# Patient Record
Sex: FEMALE | Race: BLACK OR AFRICAN AMERICAN | NOT HISPANIC OR LATINO | Employment: OTHER | URBAN - METROPOLITAN AREA
[De-identification: names, ages, dates, MRNs, and addresses within clinical notes are randomized per-mention and may not be internally consistent; named-entity substitution may affect disease eponyms.]

---

## 2023-04-14 ENCOUNTER — OFFICE VISIT (OUTPATIENT)
Dept: PRIMARY CARE | Facility: CLINIC | Age: 73
End: 2023-04-14
Payer: MEDICARE

## 2023-04-14 ENCOUNTER — LAB (OUTPATIENT)
Dept: LAB | Facility: LAB | Age: 73
End: 2023-04-14
Payer: MEDICARE

## 2023-04-14 VITALS
DIASTOLIC BLOOD PRESSURE: 80 MMHG | TEMPERATURE: 95.5 F | SYSTOLIC BLOOD PRESSURE: 170 MMHG | HEART RATE: 98 BPM | OXYGEN SATURATION: 97 % | WEIGHT: 287 LBS | RESPIRATION RATE: 16 BRPM

## 2023-04-14 DIAGNOSIS — E11.69 DIABETES MELLITUS TYPE 2 IN OBESE: ICD-10-CM

## 2023-04-14 DIAGNOSIS — Z12.31 ENCOUNTER FOR SCREENING MAMMOGRAM FOR MALIGNANT NEOPLASM OF BREAST: ICD-10-CM

## 2023-04-14 DIAGNOSIS — I82.469 DEEP VEIN THROMBOSIS (DVT) OF CALF MUSCLE VEIN, UNSPECIFIED CHRONICITY, UNSPECIFIED LATERALITY (MULTI): ICD-10-CM

## 2023-04-14 DIAGNOSIS — E78.00 HYPERCHOLESTEROLEMIA: ICD-10-CM

## 2023-04-14 DIAGNOSIS — N18.31 TYPE 2 DIABETES MELLITUS WITH STAGE 3A CHRONIC KIDNEY DISEASE, WITHOUT LONG-TERM CURRENT USE OF INSULIN (MULTI): ICD-10-CM

## 2023-04-14 DIAGNOSIS — E11.22 TYPE 2 DIABETES MELLITUS WITH STAGE 3A CHRONIC KIDNEY DISEASE, WITHOUT LONG-TERM CURRENT USE OF INSULIN (MULTI): ICD-10-CM

## 2023-04-14 DIAGNOSIS — E66.9 DIABETES MELLITUS TYPE 2 IN OBESE: ICD-10-CM

## 2023-04-14 DIAGNOSIS — N18.31 STAGE 3A CHRONIC KIDNEY DISEASE (MULTI): ICD-10-CM

## 2023-04-14 DIAGNOSIS — I10 ESSENTIAL HYPERTENSION: Primary | ICD-10-CM

## 2023-04-14 DIAGNOSIS — K21.9 GASTROESOPHAGEAL REFLUX DISEASE WITHOUT ESOPHAGITIS: ICD-10-CM

## 2023-04-14 DIAGNOSIS — E66.01 MORBID (SEVERE) OBESITY DUE TO EXCESS CALORIES (MULTI): ICD-10-CM

## 2023-04-14 DIAGNOSIS — J45.40 MODERATE PERSISTENT ASTHMA WITHOUT COMPLICATION (HHS-HCC): ICD-10-CM

## 2023-04-14 PROBLEM — R09.02 HYPOXIA: Status: RESOLVED | Noted: 2021-02-02 | Resolved: 2023-04-14

## 2023-04-14 PROBLEM — E44.1 MALNUTRITION OF MILD DEGREE (MULTI): Status: RESOLVED | Noted: 2021-01-23 | Resolved: 2023-04-14

## 2023-04-14 PROBLEM — I82.409 DVT (DEEP VENOUS THROMBOSIS) (MULTI): Status: ACTIVE | Noted: 2021-03-04

## 2023-04-14 PROBLEM — R10.33 PERIUMBILICAL ABDOMINAL PAIN: Status: RESOLVED | Noted: 2023-04-14 | Resolved: 2023-04-14

## 2023-04-14 PROBLEM — R10.33 PERIUMBILICAL ABDOMINAL PAIN: Status: ACTIVE | Noted: 2023-04-14

## 2023-04-14 PROBLEM — N18.30 STAGE 3 CHRONIC KIDNEY DISEASE (MULTI): Status: ACTIVE | Noted: 2022-01-31

## 2023-04-14 PROBLEM — R09.02 HYPOXIA: Status: ACTIVE | Noted: 2021-02-02

## 2023-04-14 PROBLEM — E44.1 MALNUTRITION OF MILD DEGREE (MULTI): Status: ACTIVE | Noted: 2021-01-23

## 2023-04-14 PROCEDURE — 3077F SYST BP >= 140 MM HG: CPT

## 2023-04-14 PROCEDURE — 3079F DIAST BP 80-89 MM HG: CPT

## 2023-04-14 PROCEDURE — 80053 COMPREHEN METABOLIC PANEL: CPT

## 2023-04-14 PROCEDURE — 1160F RVW MEDS BY RX/DR IN RCRD: CPT

## 2023-04-14 PROCEDURE — 83036 HEMOGLOBIN GLYCOSYLATED A1C: CPT

## 2023-04-14 PROCEDURE — 84443 ASSAY THYROID STIM HORMONE: CPT

## 2023-04-14 PROCEDURE — 99204 OFFICE O/P NEW MOD 45 MIN: CPT

## 2023-04-14 PROCEDURE — 1036F TOBACCO NON-USER: CPT

## 2023-04-14 PROCEDURE — 36415 COLL VENOUS BLD VENIPUNCTURE: CPT

## 2023-04-14 PROCEDURE — 4010F ACE/ARB THERAPY RXD/TAKEN: CPT

## 2023-04-14 PROCEDURE — 80061 LIPID PANEL: CPT

## 2023-04-14 PROCEDURE — 1159F MED LIST DOCD IN RCRD: CPT

## 2023-04-14 PROCEDURE — 85027 COMPLETE CBC AUTOMATED: CPT

## 2023-04-14 RX ORDER — METOPROLOL SUCCINATE 50 MG/1
50 TABLET, EXTENDED RELEASE ORAL DAILY
COMMUNITY
Start: 2021-02-01 | End: 2023-04-14 | Stop reason: SDUPTHER

## 2023-04-14 RX ORDER — ATORVASTATIN CALCIUM 40 MG/1
40 TABLET, FILM COATED ORAL DAILY
Qty: 90 TABLET | Refills: 0 | Status: SHIPPED | OUTPATIENT
Start: 2023-04-14 | End: 2023-07-31 | Stop reason: SDUPTHER

## 2023-04-14 RX ORDER — FLUTICASONE PROPIONATE AND SALMETEROL 250; 50 UG/1; UG/1
1 POWDER RESPIRATORY (INHALATION)
Qty: 180 EACH | Refills: 0 | Status: SHIPPED | OUTPATIENT
Start: 2023-04-14 | End: 2023-04-25 | Stop reason: SDUPTHER

## 2023-04-14 RX ORDER — ACETAMINOPHEN 500 MG
1 TABLET ORAL ONCE
Qty: 1 EACH | Refills: 0 | Status: SHIPPED | OUTPATIENT
Start: 2023-04-14 | End: 2023-04-14

## 2023-04-14 RX ORDER — LOSARTAN POTASSIUM 50 MG/1
50 TABLET ORAL DAILY
COMMUNITY
End: 2023-04-14 | Stop reason: SDUPTHER

## 2023-04-14 RX ORDER — METFORMIN HYDROCHLORIDE 1000 MG/1
1000 TABLET ORAL
Qty: 180 TABLET | Refills: 0 | Status: SHIPPED | OUTPATIENT
Start: 2023-04-14 | End: 2023-04-27

## 2023-04-14 RX ORDER — FLASH GLUCOSE SENSOR
KIT MISCELLANEOUS
COMMUNITY
Start: 2022-12-27 | End: 2023-04-14 | Stop reason: SDUPTHER

## 2023-04-14 RX ORDER — GLIPIZIDE 10 MG/1
10 TABLET, FILM COATED, EXTENDED RELEASE ORAL
Qty: 90 TABLET | Refills: 0 | Status: SHIPPED | OUTPATIENT
Start: 2023-04-14 | End: 2023-07-31 | Stop reason: SDUPTHER

## 2023-04-14 RX ORDER — LOSARTAN POTASSIUM 50 MG/1
50 TABLET ORAL DAILY
Qty: 90 TABLET | Refills: 0 | Status: SHIPPED | OUTPATIENT
Start: 2023-04-14 | End: 2023-07-10 | Stop reason: SDUPTHER

## 2023-04-14 RX ORDER — METOPROLOL SUCCINATE 50 MG/1
50 TABLET, EXTENDED RELEASE ORAL DAILY
Qty: 90 TABLET | Refills: 0 | Status: SHIPPED | OUTPATIENT
Start: 2023-04-14 | End: 2023-07-31 | Stop reason: SDUPTHER

## 2023-04-14 RX ORDER — FLUTICASONE PROPIONATE AND SALMETEROL 250; 50 UG/1; UG/1
1 POWDER RESPIRATORY (INHALATION) 2 TIMES DAILY
COMMUNITY
Start: 2021-12-16 | End: 2023-04-14 | Stop reason: SDUPTHER

## 2023-04-14 RX ORDER — FLASH GLUCOSE SENSOR
KIT MISCELLANEOUS
Qty: 6 EACH | Refills: 0 | Status: SHIPPED | OUTPATIENT
Start: 2023-04-14 | End: 2023-06-08 | Stop reason: RX

## 2023-04-14 RX ORDER — ATORVASTATIN CALCIUM 40 MG/1
40 TABLET, FILM COATED ORAL DAILY
COMMUNITY
Start: 2016-01-08 | End: 2023-04-14 | Stop reason: SDUPTHER

## 2023-04-14 RX ORDER — ASPIRIN 81 MG/1
81 TABLET ORAL DAILY
COMMUNITY
End: 2023-11-29 | Stop reason: SDUPTHER

## 2023-04-14 RX ORDER — METFORMIN HYDROCHLORIDE 1000 MG/1
1000 TABLET ORAL
COMMUNITY
Start: 2016-03-16 | End: 2023-04-14 | Stop reason: SDUPTHER

## 2023-04-14 RX ORDER — GLIPIZIDE 10 MG/1
10 TABLET, FILM COATED, EXTENDED RELEASE ORAL
COMMUNITY
End: 2023-04-14 | Stop reason: SDUPTHER

## 2023-04-14 ASSESSMENT — ENCOUNTER SYMPTOMS
OCCASIONAL FEELINGS OF UNSTEADINESS: 0
LOSS OF SENSATION IN FEET: 0
DEPRESSION: 0

## 2023-04-14 ASSESSMENT — PATIENT HEALTH QUESTIONNAIRE - PHQ9
2. FEELING DOWN, DEPRESSED OR HOPELESS: NOT AT ALL
SUM OF ALL RESPONSES TO PHQ9 QUESTIONS 1 AND 2: 0
1. LITTLE INTEREST OR PLEASURE IN DOING THINGS: NOT AT ALL

## 2023-04-14 NOTE — PROGRESS NOTES
Subjective   Patient ID: Renee Candelario is a 73 y.o. female who presents for Establish Care.  HPI  73 year old female with PMH of T2DM, HTN, HLD.   DM2: glipizide XL 10mg and metformin   HTN: currently takes losartan 50mg, metoprolol 50mg, though has been out of medication x4 days.   HLD: On atorvastatin 40mg daily    Did Cologuard last year, will try to get records     Diet: Watches sugar and carbohydrate intake  Exercise: planning to restart exercising now that weather is warmer  Nicotine: none  ETOH: none  Drug use: none  Dental care: Need to establish care  Vision concerns: UTD  Hearing concerns: none    All systems have been reviewed and are negative for complaint other than those mentioned in the HPI.     /80   Pulse 98   Temp 35.3 °C (95.5 °F)   Resp 16   Wt 130 kg (287 lb)   SpO2 97%    Objective   Physical Exam  Constitutional:       General: She is awake.      Appearance: Normal appearance.   HENT:      Head: Normocephalic and atraumatic.   Eyes:      Extraocular Movements: Extraocular movements intact.      Pupils: Pupils are equal, round, and reactive to light.   Cardiovascular:      Rate and Rhythm: Normal rate and regular rhythm.      Heart sounds: S1 normal and S2 normal. No murmur heard.  Pulmonary:      Effort: Pulmonary effort is normal.      Breath sounds: Normal breath sounds.   Musculoskeletal:      Cervical back: Normal range of motion and neck supple.      Right lower leg: No edema.      Left lower leg: No edema.   Skin:     General: Skin is warm and dry.   Neurological:      General: No focal deficit present.      Mental Status: She is alert and oriented to person, place, and time.   Psychiatric:         Mood and Affect: Mood and affect normal.         Behavior: Behavior normal. Behavior is cooperative.         Thought Content: Thought content normal.         Judgment: Judgment normal.     Renee was seen today for establish care.  Diagnoses and all orders for this  visit:  Essential hypertension (Primary)  -     NOT AT GOAL, has been out of HTN medication x4 days. Recommend picking up new Rx, taking medication x2 weeks. Follow up in office for BP recheck  - Recommend checking once daily at home and keeping a log. Bring to next appt  - DASH diet reviewed  - losartan (Cozaar) 50 mg tablet; Take 1 tablet (50 mg) by mouth once daily.  -     metoprolol succinate XL (Toprol-XL) 50 mg 24 hr tablet; Take 1 tablet (50 mg) by mouth once daily.  -     blood pressure test kit-large kit; 1 kit 1 time for 1 dose.  -     Referral to Ophthalmology; Future  -     Disability Placard  Type 2 diabetes mellitus with stage 3a chronic kidney disease, without long-term current use of insulin (CMS/HCC)  -     Currently taking metformin, glipizide, and using scarlet CGM. Patient would likely benefit from GLP-1. Will check A1C today and address at next visit.   - FreeStyle Scarlet sensor system (FreeStyle Scarlet 14 Day Sensor) kit; use as directed  -     glipiZIDE XL (Glucotrol XL) 10 mg 24 hr tablet; Take 1 tablet (10 mg) by mouth once daily with a meal.  -     metFORMIN (Glucophage) 1,000 mg tablet; Take 1 tablet (1,000 mg) by mouth in the morning and 1 tablet (1,000 mg) in the evening. Take with meals.  -     CBC; Future  -     Comprehensive Metabolic Panel; Future  -     Hemoglobin A1C; Future  -     TSH with reflex to Free T4 if abnormal; Future  -     Albumin, urine, random; Future  Deep vein thrombosis (DVT) of calf muscle vein, unspecified chronicity, unspecified laterality (CMS/HCC)  -     DVT years ago when she had COVID, resolved. Was provoked, no longer on anticoagulant. No swelling in legs, no current symptoms  - Disability Placard  Morbid (severe) obesity due to excess calories (CMS/HCC)  -     Disability Placard  - Will discuss GLP-1 at next appointment. Discussed healthy diet, role of exercise. Patient will work on lifestyle modifications.   Stage 3a chronic kidney disease  -     CMP  today  - Disability Placard  Gastroesophageal reflux disease without esophagitis  -     Stable, uses prilosec PRN   Diabetes mellitus type 2 in obese (CMS/HCC)  -    See above  -  Referral to Ophthalmology; Future  -     Disability Placard  Hypercholesterolemia  -    Stable, on atorvastatin. Continue current meds.   -  atorvastatin (Lipitor) 40 mg tablet; Take 1 tablet (40 mg) by mouth once daily.  -     Lipid Panel; Future  Moderate persistent asthma without complication  -     Stable, continue current meds. Not needing rescue inhaler.   - fluticasone propion-salmeteroL (Advair Diskus) 250-50 mcg/dose diskus inhaler; Inhale 1 puff  in the morning and 1 puff in the evening.  -     Disability Placard  Encounter for screening mammogram for malignant neoplasm of breast  -     BI mammo bilateral screening tomosynthesis; Future  Other orders  -     Follow Up In Primary Care; Future    Follow up in 2 weeks for BP recheck.       Follow up up in 2 weeks.

## 2023-04-15 LAB
ALANINE AMINOTRANSFERASE (SGPT) (U/L) IN SER/PLAS: 12 U/L (ref 7–45)
ALBUMIN (G/DL) IN SER/PLAS: 4.1 G/DL (ref 3.4–5)
ALKALINE PHOSPHATASE (U/L) IN SER/PLAS: 118 U/L (ref 33–136)
ANION GAP IN SER/PLAS: 20 MMOL/L (ref 10–20)
ASPARTATE AMINOTRANSFERASE (SGOT) (U/L) IN SER/PLAS: 12 U/L (ref 9–39)
BILIRUBIN TOTAL (MG/DL) IN SER/PLAS: 0.3 MG/DL (ref 0–1.2)
CALCIUM (MG/DL) IN SER/PLAS: 9.8 MG/DL (ref 8.6–10.6)
CARBON DIOXIDE, TOTAL (MMOL/L) IN SER/PLAS: 21 MMOL/L (ref 21–32)
CHLORIDE (MMOL/L) IN SER/PLAS: 101 MMOL/L (ref 98–107)
CHOLESTEROL (MG/DL) IN SER/PLAS: 188 MG/DL (ref 0–199)
CHOLESTEROL IN HDL (MG/DL) IN SER/PLAS: 49.2 MG/DL
CHOLESTEROL/HDL RATIO: 3.8
CREATININE (MG/DL) IN SER/PLAS: 1.95 MG/DL (ref 0.5–1.05)
ERYTHROCYTE DISTRIBUTION WIDTH (RATIO) BY AUTOMATED COUNT: 14 % (ref 11.5–14.5)
ERYTHROCYTE MEAN CORPUSCULAR HEMOGLOBIN CONCENTRATION (G/DL) BY AUTOMATED: 29.6 G/DL (ref 32–36)
ERYTHROCYTE MEAN CORPUSCULAR VOLUME (FL) BY AUTOMATED COUNT: 101 FL (ref 80–100)
ERYTHROCYTES (10*6/UL) IN BLOOD BY AUTOMATED COUNT: 4.14 X10E12/L (ref 4–5.2)
ESTIMATED AVERAGE GLUCOSE FOR HBA1C: 260 MG/DL
GFR FEMALE: 27 ML/MIN/1.73M2
GLUCOSE (MG/DL) IN SER/PLAS: 355 MG/DL (ref 74–99)
HEMATOCRIT (%) IN BLOOD BY AUTOMATED COUNT: 41.9 % (ref 36–46)
HEMOGLOBIN (G/DL) IN BLOOD: 12.4 G/DL (ref 12–16)
HEMOGLOBIN A1C/HEMOGLOBIN TOTAL IN BLOOD: 10.7 %
LDL: 83 MG/DL (ref 0–99)
LEUKOCYTES (10*3/UL) IN BLOOD BY AUTOMATED COUNT: 8.5 X10E9/L (ref 4.4–11.3)
NON HDL CHOLESTEROL: 139 MG/DL
NRBC (PER 100 WBCS) BY AUTOMATED COUNT: 0 /100 WBC (ref 0–0)
PLATELETS (10*3/UL) IN BLOOD AUTOMATED COUNT: 292 X10E9/L (ref 150–450)
POTASSIUM (MMOL/L) IN SER/PLAS: 4.6 MMOL/L (ref 3.5–5.3)
PROTEIN TOTAL: 7.1 G/DL (ref 6.4–8.2)
SODIUM (MMOL/L) IN SER/PLAS: 137 MMOL/L (ref 136–145)
THYROTROPIN (MIU/L) IN SER/PLAS BY DETECTION LIMIT <= 0.05 MIU/L: 1.24 MIU/L (ref 0.44–3.98)
TRIGLYCERIDE (MG/DL) IN SER/PLAS: 279 MG/DL (ref 0–149)
UREA NITROGEN (MG/DL) IN SER/PLAS: 25 MG/DL (ref 6–23)
VLDL: 56 MG/DL (ref 0–40)

## 2023-04-18 ENCOUNTER — LAB (OUTPATIENT)
Dept: LAB | Facility: LAB | Age: 73
End: 2023-04-18
Payer: MEDICARE

## 2023-04-18 DIAGNOSIS — N18.31 TYPE 2 DIABETES MELLITUS WITH STAGE 3A CHRONIC KIDNEY DISEASE, WITHOUT LONG-TERM CURRENT USE OF INSULIN (MULTI): ICD-10-CM

## 2023-04-18 DIAGNOSIS — E11.22 TYPE 2 DIABETES MELLITUS WITH STAGE 3A CHRONIC KIDNEY DISEASE, WITHOUT LONG-TERM CURRENT USE OF INSULIN (MULTI): ICD-10-CM

## 2023-04-18 PROCEDURE — 82043 UR ALBUMIN QUANTITATIVE: CPT

## 2023-04-18 PROCEDURE — 82570 ASSAY OF URINE CREATININE: CPT

## 2023-04-19 ENCOUNTER — CLINICAL SUPPORT (OUTPATIENT)
Dept: PRIMARY CARE | Facility: CLINIC | Age: 73
End: 2023-04-19
Payer: MEDICARE

## 2023-04-19 DIAGNOSIS — E66.9 DIABETES MELLITUS TYPE 2 IN OBESE: ICD-10-CM

## 2023-04-19 DIAGNOSIS — E11.69 DIABETES MELLITUS TYPE 2 IN OBESE: ICD-10-CM

## 2023-04-19 LAB
ALBUMIN (MG/L) IN URINE: 30.3 MG/L
ALBUMIN/CREATININE (UG/MG) IN URINE: 41.4 UG/MG CRT (ref 0–30)
CREATININE (MG/DL) IN URINE: 73.2 MG/DL (ref 20–320)

## 2023-04-19 NOTE — PROGRESS NOTES
Patient here for diabetic insulin teaching. Patient brought own medication Ozempic 0.25 mg,0.5 mg. Patient taught proper technique with pen and injection site cleaning, dial- up correct insulin dose to use along with priming first time open pen, proper holding of pen to inject and count to 10, injection done. Patient confident with teaching understands process and proceeded to replicate teaching and gave self injection with out any issue. Patient understands to call if any questions or side-effects

## 2023-04-25 DIAGNOSIS — J45.40 MODERATE PERSISTENT ASTHMA WITHOUT COMPLICATION (HHS-HCC): ICD-10-CM

## 2023-04-25 RX ORDER — FLUTICASONE PROPIONATE AND SALMETEROL 250; 50 UG/1; UG/1
1 POWDER RESPIRATORY (INHALATION)
Qty: 180 EACH | Refills: 0 | Status: SHIPPED | OUTPATIENT
Start: 2023-04-25 | End: 2023-08-07 | Stop reason: SDUPTHER

## 2023-04-27 ENCOUNTER — OFFICE VISIT (OUTPATIENT)
Dept: PRIMARY CARE | Facility: CLINIC | Age: 73
End: 2023-04-27
Payer: MEDICARE

## 2023-04-27 VITALS
DIASTOLIC BLOOD PRESSURE: 70 MMHG | BODY MASS INDEX: 46.12 KG/M2 | SYSTOLIC BLOOD PRESSURE: 130 MMHG | TEMPERATURE: 96.4 F | WEIGHT: 287 LBS | HEIGHT: 66 IN

## 2023-04-27 DIAGNOSIS — I12.9 TYPE 2 DIABETES MELLITUS WITH STAGE 4 CHRONIC KIDNEY DISEASE AND HYPERTENSION (MULTI): ICD-10-CM

## 2023-04-27 DIAGNOSIS — N18.4 CKD (CHRONIC KIDNEY DISEASE) STAGE 4, GFR 15-29 ML/MIN (MULTI): ICD-10-CM

## 2023-04-27 DIAGNOSIS — I10 ESSENTIAL HYPERTENSION: ICD-10-CM

## 2023-04-27 DIAGNOSIS — N18.4 TYPE 2 DIABETES MELLITUS WITH STAGE 4 CHRONIC KIDNEY DISEASE AND HYPERTENSION (MULTI): ICD-10-CM

## 2023-04-27 DIAGNOSIS — Z12.11 SCREEN FOR COLON CANCER: ICD-10-CM

## 2023-04-27 DIAGNOSIS — N18.31 STAGE 3A CHRONIC KIDNEY DISEASE (MULTI): ICD-10-CM

## 2023-04-27 DIAGNOSIS — E78.00 HYPERCHOLESTEROLEMIA: ICD-10-CM

## 2023-04-27 DIAGNOSIS — Z00.00 MEDICARE ANNUAL WELLNESS VISIT, SUBSEQUENT: Primary | ICD-10-CM

## 2023-04-27 DIAGNOSIS — E11.22 TYPE 2 DIABETES MELLITUS WITH STAGE 4 CHRONIC KIDNEY DISEASE AND HYPERTENSION (MULTI): ICD-10-CM

## 2023-04-27 PROBLEM — N18.30 STAGE 3 CHRONIC KIDNEY DISEASE (MULTI): Status: RESOLVED | Noted: 2022-01-31 | Resolved: 2023-04-27

## 2023-04-27 PROBLEM — I82.409 DVT (DEEP VENOUS THROMBOSIS) (MULTI): Status: RESOLVED | Noted: 2021-03-04 | Resolved: 2023-04-27

## 2023-04-27 PROCEDURE — 3078F DIAST BP <80 MM HG: CPT

## 2023-04-27 PROCEDURE — 3075F SYST BP GE 130 - 139MM HG: CPT

## 2023-04-27 PROCEDURE — 3046F HEMOGLOBIN A1C LEVEL >9.0%: CPT

## 2023-04-27 PROCEDURE — 4010F ACE/ARB THERAPY RXD/TAKEN: CPT

## 2023-04-27 PROCEDURE — G0439 PPPS, SUBSEQ VISIT: HCPCS

## 2023-04-27 PROCEDURE — 1160F RVW MEDS BY RX/DR IN RCRD: CPT

## 2023-04-27 PROCEDURE — 99214 OFFICE O/P EST MOD 30 MIN: CPT

## 2023-04-27 PROCEDURE — 1159F MED LIST DOCD IN RCRD: CPT

## 2023-04-27 PROCEDURE — 1036F TOBACCO NON-USER: CPT

## 2023-04-27 ASSESSMENT — PATIENT HEALTH QUESTIONNAIRE - PHQ9
1. LITTLE INTEREST OR PLEASURE IN DOING THINGS: NOT AT ALL
SUM OF ALL RESPONSES TO PHQ9 QUESTIONS 1 AND 2: 0
2. FEELING DOWN, DEPRESSED OR HOPELESS: NOT AT ALL

## 2023-04-27 ASSESSMENT — LIFESTYLE VARIABLES: HOW OFTEN DO YOU HAVE A DRINK CONTAINING ALCOHOL: NEVER

## 2023-04-27 NOTE — PROGRESS NOTES
"Subjective   Patient ID: Renee Candelario is a 73 y.o. female who presents for Follow-up and MWV.  HPI  The patient is being seen for the subsequent annual wellness visit and follow up.  Past Medical, Surgical and Family History: reviewed and updated in chart.   Interval History: Patient has not been hospitalized previously.   Medications and Supplements: Review of all medications by a prescribing practitioner or clinical pharmacist (such as prescriptions, OTCs, herbal therapies and supplements) documented in the medical record.    No, the patient is not using opioids.   Health Risk Assessment:. Paper HRA completed by patient and scanned into chart.   Depression/Suicide Screening:    Done, negative.   No falls in the past year.  No recent hospitalizations.    73 year old female with PMH of T2DM, HTN, HLD.   DM2: , glipizide XL 10mg and ozempic .25mg (started 04/20/23) --> referral endocrine today  HTN: currently takes losartan 50mg, metoprolol 50mg  HLD: On atorvastatin 40mg daily  CKD: stage four. --> referral nephrology today    Checking blood sugar 2-3 times a day. Running 120s-320. Using Freestyle manfred. Need to discontinue metformin d/t low GFR.     All systems have been reviewed and are negative for complaint other than those mentioned in the HPI.     Objective   /70 (BP Location: Left arm, Patient Position: Sitting, BP Cuff Size: Large adult)   Temp 35.8 °C (96.4 °F) (Temporal)   Ht 1.676 m (5' 6\")   Wt 130 kg (287 lb)   BMI 46.32 kg/m²    Physical Exam  Constitutional:       General: She is awake.      Appearance: Normal appearance.   HENT:      Head: Normocephalic and atraumatic.   Eyes:      Extraocular Movements: Extraocular movements intact.      Pupils: Pupils are equal, round, and reactive to light.   Cardiovascular:      Rate and Rhythm: Normal rate and regular rhythm.      Heart sounds: S1 normal and S2 normal. No murmur heard.  Pulmonary:      Effort: Pulmonary effort is normal.      Breath " sounds: Normal breath sounds.   Musculoskeletal:      Cervical back: Normal range of motion and neck supple.      Right lower leg: No edema.      Left lower leg: No edema.   Skin:     General: Skin is warm and dry.   Neurological:      General: No focal deficit present.      Mental Status: She is alert and oriented to person, place, and time.   Psychiatric:         Mood and Affect: Mood and affect normal.         Behavior: Behavior normal. Behavior is cooperative.         Thought Content: Thought content normal.         Judgment: Judgment normal.     Renee was seen today for follow-up and mwv.  Diagnoses and all orders for this visit  Medicare annual wellness visit, subsequent (Primary)  - In usual state of health, no concerns today  - Due for colon cancer screening, recommend completing   -See scanned HRA for MWV documentation.   Essential hypertension  - Near goal today, continue current meds  Type 2 diabetes mellitus with stage 4 chronic kidney disease and hypertension (CMS/HCC)  -     A1C 10.4, not at goal. Started ozempic 0.25mg last week. Fasting -130 over past two weeks, BG up to 300s during day.   - Hesitant to start long acting insulin given low fasting blood glucoses. Needs to stop metformin given low GFR.   - Will reach out to endocrinology to help expedite establishing care.   - Referral to nutrition, large knowledge gap in diabetic diet.   - Referral to Endocrinology; Future  -     Referral to Nutrition Services; Future  Hypercholesterolemia   - Continue atorvastatin  CKD (chronic kidney disease) stage 4, GFR 15-29 ml/min (CMS/HCC)  -     GFR worsening in setting of uncontrolled diabetes. Asymptomatic. Stop metformin. Referrals to endo and nephro placed.   - Referral to Nephrology; Future  Screen for colon cancer  -     Cologuard® colon cancer screening; Future  Other orders  -     Follow Up In Primary Care    Follow up in 1 month if not yet seen by endocrinology to review blood glucoses.

## 2023-04-28 ENCOUNTER — APPOINTMENT (OUTPATIENT)
Dept: PRIMARY CARE | Facility: CLINIC | Age: 73
End: 2023-04-28
Payer: MEDICARE

## 2023-05-07 LAB — NONINV COLON CA DNA+OCC BLD SCRN STL QL: NORMAL

## 2023-05-18 LAB — NONINV COLON CA DNA+OCC BLD SCRN STL QL: NORMAL

## 2023-05-25 ENCOUNTER — APPOINTMENT (OUTPATIENT)
Dept: PRIMARY CARE | Facility: CLINIC | Age: 73
End: 2023-05-25
Payer: MEDICARE

## 2023-05-25 ENCOUNTER — OFFICE VISIT (OUTPATIENT)
Dept: PRIMARY CARE | Facility: CLINIC | Age: 73
End: 2023-05-25
Payer: MEDICARE

## 2023-05-25 VITALS
HEIGHT: 65 IN | DIASTOLIC BLOOD PRESSURE: 74 MMHG | WEIGHT: 283 LBS | SYSTOLIC BLOOD PRESSURE: 130 MMHG | BODY MASS INDEX: 47.15 KG/M2

## 2023-05-25 DIAGNOSIS — I10 ESSENTIAL HYPERTENSION: Primary | ICD-10-CM

## 2023-05-25 DIAGNOSIS — E78.00 HYPERCHOLESTEROLEMIA: ICD-10-CM

## 2023-05-25 DIAGNOSIS — N18.4 CKD (CHRONIC KIDNEY DISEASE) STAGE 4, GFR 15-29 ML/MIN (MULTI): ICD-10-CM

## 2023-05-25 DIAGNOSIS — E11.22 TYPE 2 DIABETES MELLITUS WITH STAGE 4 CHRONIC KIDNEY DISEASE AND HYPERTENSION (MULTI): ICD-10-CM

## 2023-05-25 DIAGNOSIS — N18.4 TYPE 2 DIABETES MELLITUS WITH STAGE 4 CHRONIC KIDNEY DISEASE AND HYPERTENSION (MULTI): ICD-10-CM

## 2023-05-25 DIAGNOSIS — I12.9 TYPE 2 DIABETES MELLITUS WITH STAGE 4 CHRONIC KIDNEY DISEASE AND HYPERTENSION (MULTI): ICD-10-CM

## 2023-05-25 DIAGNOSIS — E66.01 OBESITY, CLASS III, BMI 40-49.9 (MORBID OBESITY) (MULTI): ICD-10-CM

## 2023-05-25 PROCEDURE — 1159F MED LIST DOCD IN RCRD: CPT

## 2023-05-25 PROCEDURE — 1160F RVW MEDS BY RX/DR IN RCRD: CPT

## 2023-05-25 PROCEDURE — 1036F TOBACCO NON-USER: CPT

## 2023-05-25 PROCEDURE — 4010F ACE/ARB THERAPY RXD/TAKEN: CPT

## 2023-05-25 PROCEDURE — 3078F DIAST BP <80 MM HG: CPT

## 2023-05-25 PROCEDURE — 3046F HEMOGLOBIN A1C LEVEL >9.0%: CPT

## 2023-05-25 PROCEDURE — 3075F SYST BP GE 130 - 139MM HG: CPT

## 2023-05-25 PROCEDURE — 99214 OFFICE O/P EST MOD 30 MIN: CPT

## 2023-05-25 RX ORDER — DAPAGLIFLOZIN 10 MG/1
10 TABLET, FILM COATED ORAL DAILY
Qty: 90 TABLET | Refills: 0 | Status: SHIPPED | OUTPATIENT
Start: 2023-05-25 | End: 2023-07-31 | Stop reason: SDUPTHER

## 2023-05-25 RX ORDER — ALBUTEROL SULFATE 90 UG/1
2 AEROSOL, METERED RESPIRATORY (INHALATION) EVERY 6 HOURS PRN
COMMUNITY
Start: 2015-12-21 | End: 2024-03-13 | Stop reason: SDUPTHER

## 2023-05-25 NOTE — PROGRESS NOTES
"Subjective   Patient ID: Renee Candelario is a 73 y.o. female who presents for Follow-up.  HPI  73 year old female with PMH of T2DM, HTN, HLD.   DM2: , glipizide XL 10mg and ozempic .5mg weekly, freestyle Scarlet, --> referred to endocrinology at last appointment, has not yet scheduled. Not open to insulin.   HTN: currently takes losartan 50mg, metoprolol 50mg  HLD: On atorvastatin 40mg daily  CKD: stage four. --> referred to nephrology at last appointment, has not yet scheduled.      Using CGM, 55% in target, 45% above target.   Cologuard --> unable to process test as sample was not corrected correctly, will redo testing.     All systems have been reviewed and are negative for complaint other than those mentioned in the HPI.     Objective   /74 (BP Location: Left arm, Patient Position: Sitting, BP Cuff Size: Large adult)   Ht 1.651 m (5' 5\")   Wt 128 kg (283 lb)   BMI 47.09 kg/m²    Physical Exam  Renee was seen today for follow-up.  Diagnoses and all orders for this visit:  Essential hypertension (Primary)   - At goal, continue current meds  Type 2 diabetes mellitus with stage 4 chronic kidney disease and hypertension (CMS/HCC)  -     NOT AT GOAL.   - Start farxiga for kidney protection, unlikely to provide significant BG control given poor function.   - Will refer again to endo, nephro, and nutrition, patient will schedule.   - Increase dose of ozempic to .5mg x1 month, then up to 1mg  - Encouraged starting insulin, patient unwilling at this time.   - dapagliflozin (Farxiga) 10 mg; Take 1 tablet (10 mg) by mouth once daily.  -     Referral to Endocrinology; Future  -     Referral to Nephrology; Future  -     Referral to Nutrition Services; Future  -     Referral to Clinical Pharmacy; Future  -     semaglutide (OZEMPIC) 1 mg/dose (4 mg/3 mL) pen injector; Inject 1 mg under the skin 1 (one) time per week.  CKD (chronic kidney disease) stage 4, GFR 15-29 ml/min (CMS/HCC)  -     Referral to Nephrology; " Future  -     Referral to Nutrition Services; Future  Obesity, Class III, BMI 40-49.9 (morbid obesity) (CMS/[facility])   - On ozempic, referred to nutrition, will continue to monitor.   Hypercholesterolemia   - Stable, recheck at next visit. Continue atorvastatin 40mg.     The patient received Provided instructions on dietary changes  Provided instructions on exercise  Advised to Increase physical activity because they have an above normal BMI.    Follow up in 2 months.

## 2023-05-25 NOTE — PATIENT INSTRUCTIONS
It was nice to see you today.   Continue 0.5mg of Ozempic for 4 weeks. Then, increase to 1 mg weekly.   Schedule appointments with:     - Endocrinology   - Nephrology   - Nutrition  The pharmacy team will call you to speak with you about possible coupons for your medications.

## 2023-06-07 LAB — NONINV COLON CA DNA+OCC BLD SCRN STL QL: NORMAL

## 2023-06-08 ENCOUNTER — TELEMEDICINE (OUTPATIENT)
Dept: PHARMACY | Facility: HOSPITAL | Age: 73
End: 2023-06-08
Payer: MEDICARE

## 2023-06-08 DIAGNOSIS — E11.22 TYPE 2 DIABETES MELLITUS WITH STAGE 4 CHRONIC KIDNEY DISEASE AND HYPERTENSION (MULTI): ICD-10-CM

## 2023-06-08 DIAGNOSIS — I12.9 TYPE 2 DIABETES MELLITUS WITH STAGE 4 CHRONIC KIDNEY DISEASE AND HYPERTENSION (MULTI): ICD-10-CM

## 2023-06-08 DIAGNOSIS — N18.4 TYPE 2 DIABETES MELLITUS WITH STAGE 4 CHRONIC KIDNEY DISEASE AND HYPERTENSION (MULTI): ICD-10-CM

## 2023-06-08 RX ORDER — FLASH GLUCOSE SENSOR
KIT MISCELLANEOUS
Qty: 2 EACH | Refills: 3 | Status: SHIPPED | OUTPATIENT
Start: 2023-06-08 | End: 2023-10-09 | Stop reason: SDUPTHER

## 2023-06-08 NOTE — ASSESSMENT & PLAN NOTE
Patient doing well today, no major complaints. Patient's pharmacy told patient there was an issue with her freestyle scarlet script, called pharmacy and Freestyle scarlet 14 was called in, sent in new script for Freestyle scarlet 2.0 sensor. Last sensor reading was 6/5, patient reports reading was 172 FBG. Patient has 1 more week of 0.5 mg dose of Ozempic before increase to 1 mg WQ. Unable to fully assess if glipizide needs to be increased or not to achieve glycemic control, will reassess in 1 week after patient receives her Freestyle scarlet sensor and records her FBG. Plan to follow up in 1 week, if sugars are far out of range of  will increase glipizide to 10 mg     PLAN  Send new script for Freestyle Scarlet 2.0 sensor to patient's pharmacy  Continue all other medications as prescribed  Patient to record her FBG for 1 week   Follow up with patient in 1 week to assess if glipizide increase necessary, will also increase Ozempic to 1 mg

## 2023-06-08 NOTE — PROGRESS NOTES
Subjective   Patient ID: Renee Candelario is a 73 y.o. female who presents for T2DM    Referring Provider: MADELIN Najera referred to pharmacy for help managing her medications. Last A1c recorded was 10.7%, patient also has CKD last recorded eGFR 29 mL/min.         Review of Systems   All other systems reviewed and are negative.      Objective     There were no vitals taken for this visit.     Labs  Lab Results   Component Value Date    BILITOT 0.3 04/14/2023    CALCIUM 9.8 04/14/2023    CO2 21 04/14/2023     04/14/2023    CREATININE 1.95 (H) 04/14/2023    GLUCOSE 355 (H) 04/14/2023    ALKPHOS 118 04/14/2023    K 4.6 04/14/2023    PROT 7.1 04/14/2023     04/14/2023    AST 12 04/14/2023    ALT 12 04/14/2023    BUN 25 (H) 04/14/2023    ANIONGAP 20 04/14/2023    ALBUMIN 4.1 04/14/2023    LIPASE 36 07/01/2019    GFRF 27 (A) 04/14/2023     Lab Results   Component Value Date    TRIG 279 (H) 04/14/2023    CHOL 188 04/14/2023    HDL 49.2 04/14/2023     Lab Results   Component Value Date    HGBA1C 10.7 (A) 04/14/2023       Current Outpatient Medications on File Prior to Visit   Medication Sig Dispense Refill    albuterol 90 mcg/actuation inhaler Inhale 2 puffs every 6 hours if needed for wheezing or shortness of breath.      aspirin 81 mg EC tablet Take 1 tablet (81 mg) by mouth once daily.      atorvastatin (Lipitor) 40 mg tablet Take 1 tablet (40 mg) by mouth once daily. 90 tablet 0    dapagliflozin (Farxiga) 10 mg Take 1 tablet (10 mg) by mouth once daily. 90 tablet 0    fluticasone propion-salmeteroL (Advair Diskus) 250-50 mcg/dose diskus inhaler Inhale 1 puff  in the morning and 1 puff in the evening. 180 each 0    glipiZIDE XL (Glucotrol XL) 10 mg 24 hr tablet Take 1 tablet (10 mg) by mouth once daily with a meal. 90 tablet 0    losartan (Cozaar) 50 mg tablet Take 1 tablet (50 mg) by mouth once daily. 90 tablet 0    metoprolol succinate XL (Toprol-XL) 50 mg 24 hr tablet Take 1 tablet (50  mg) by mouth once daily. 90 tablet 0    multivit-min/ferrous fumarate (MULTI VITAMIN ORAL) Take 1 tablet by mouth once daily.      semaglutide (OZEMPIC) 1 mg/dose (4 mg/3 mL) pen injector Inject 1 mg under the skin 1 (one) time per week. 9 mL 0    [DISCONTINUED] FreeStyle Scarlet sensor system (FreeStyle Scarlet 14 Day Sensor) kit use as directed 6 each 0     No current facility-administered medications on file prior to visit.        Assessment/Plan   Problem List Items Addressed This Visit          Circulatory    Type 2 diabetes mellitus with stage 4 chronic kidney disease and hypertension (CMS/[facility])     Patient doing well today, no major complaints. Patient's pharmacy told patient there was an issue with her freestyle scarlet script, called pharmacy and Freestyle scarlet 14 was called in, sent in new script for Freestyle scarlet 2.0 sensor. Last sensor reading was 6/5, patient reports reading was 172 FBG. Patient has 1 more week of 0.5 mg dose of Ozempic before increase to 1 mg WQ. Unable to fully assess if glipizide needs to be increased or not to achieve glycemic control, will reassess in 1 week after patient receives her Freestyle scarlet sensor and records her FBG. Plan to follow up in 1 week, if sugars are far out of range of  will increase glipizide to 10 mg     PLAN  Send new script for Freestyle Scarlet 2.0 sensor to patient's pharmacy  Continue all other medications as prescribed  Patient to record her FBG for 1 week   Follow up with patient in 1 week to assess if glipizide increase necessary, will also increase Ozempic to 1 mg            Dayton Luo, PharmD  PGY-1 Pharmacy Resident  [email or web address]   P: [phone]     Continue all meds under the continuation of care with the referring provider and clinical pharmacy team.

## 2023-06-12 NOTE — PROGRESS NOTES
I reviewed the progress note and agree with the resident’s findings and plans as written. Case discussed with resident.    Nolan Quezada, PharmD

## 2023-06-15 ENCOUNTER — TELEMEDICINE (OUTPATIENT)
Dept: PHARMACY | Facility: HOSPITAL | Age: 73
End: 2023-06-15
Payer: MEDICARE

## 2023-06-15 DIAGNOSIS — I12.9 TYPE 2 DIABETES MELLITUS WITH STAGE 4 CHRONIC KIDNEY DISEASE AND HYPERTENSION (MULTI): Primary | ICD-10-CM

## 2023-06-15 DIAGNOSIS — E11.22 TYPE 2 DIABETES MELLITUS WITH STAGE 4 CHRONIC KIDNEY DISEASE AND HYPERTENSION (MULTI): Primary | ICD-10-CM

## 2023-06-15 DIAGNOSIS — N18.4 TYPE 2 DIABETES MELLITUS WITH STAGE 4 CHRONIC KIDNEY DISEASE AND HYPERTENSION (MULTI): Primary | ICD-10-CM

## 2023-06-15 NOTE — ASSESSMENT & PLAN NOTE
Patient was unable to  her Freestyle Scarlet sensor this week, her insurance stated she could not receive it until this week. Will wait to assess if increase in glipizide is needed until we have FBG data. This is the patient's 4th week of Ozempic 0.5 mg, plan to increase to 1 mg next week, PCP has already sent  in script. Will follow up next week to assess FBG    PLAN  Increase Ozempic to 1 mg QW starting next week  Continue all other medications as prescribed  Follow up in 1 week to assess FBG and need for further medication adjustments

## 2023-06-15 NOTE — PROGRESS NOTES
Subjective   Patient ID: Renee Candelario is a 73 y.o. female who presents for No chief complaint on file..    Referring Provider: MADELIN Najera     HPI    Review of Systems    Objective     There were no vitals taken for this visit.     Labs  Lab Results   Component Value Date    BILITOT 0.3 04/14/2023    CALCIUM 9.8 04/14/2023    CO2 21 04/14/2023     04/14/2023    CREATININE 1.95 (H) 04/14/2023    GLUCOSE 355 (H) 04/14/2023    ALKPHOS 118 04/14/2023    K 4.6 04/14/2023    PROT 7.1 04/14/2023     04/14/2023    AST 12 04/14/2023    ALT 12 04/14/2023    BUN 25 (H) 04/14/2023    ANIONGAP 20 04/14/2023    ALBUMIN 4.1 04/14/2023    LIPASE 36 07/01/2019    GFRF 27 (A) 04/14/2023     Lab Results   Component Value Date    TRIG 279 (H) 04/14/2023    CHOL 188 04/14/2023    HDL 49.2 04/14/2023     Lab Results   Component Value Date    HGBA1C 10.7 (A) 04/14/2023       Current Outpatient Medications on File Prior to Visit   Medication Sig Dispense Refill    albuterol 90 mcg/actuation inhaler Inhale 2 puffs every 6 hours if needed for wheezing or shortness of breath.      aspirin 81 mg EC tablet Take 1 tablet (81 mg) by mouth once daily.      atorvastatin (Lipitor) 40 mg tablet Take 1 tablet (40 mg) by mouth once daily. 90 tablet 0    dapagliflozin (Farxiga) 10 mg Take 1 tablet (10 mg) by mouth once daily. 90 tablet 0    fluticasone propion-salmeteroL (Advair Diskus) 250-50 mcg/dose diskus inhaler Inhale 1 puff  in the morning and 1 puff in the evening. 180 each 0    FreeStyle Scarlet sensor system (FreeStyle Scarlet 2 Sensor) kit Use as instructed. Replace sensor every 14 days 2 each 3    glipiZIDE XL (Glucotrol XL) 10 mg 24 hr tablet Take 1 tablet (10 mg) by mouth once daily with a meal. 90 tablet 0    losartan (Cozaar) 50 mg tablet Take 1 tablet (50 mg) by mouth once daily. 90 tablet 0    metoprolol succinate XL (Toprol-XL) 50 mg 24 hr tablet Take 1 tablet (50 mg) by mouth once daily. 90 tablet 0     multivit-min/ferrous fumarate (MULTI VITAMIN ORAL) Take 1 tablet by mouth once daily.      semaglutide (OZEMPIC) 1 mg/dose (4 mg/3 mL) pen injector Inject 1 mg under the skin 1 (one) time per week. 9 mL 0     No current facility-administered medications on file prior to visit.        Assessment/Plan   Problem List Items Addressed This Visit          Circulatory    Type 2 diabetes mellitus with stage 4 chronic kidney disease and hypertension (CMS/[facility]) - Primary     Patient was unable to  her Freestyle Scarlet sensor this week, her insurance stated she could not receive it until this week. Will wait to assess if increase in glipizide is needed until we have FBG data. This is the patient's 4th week of Ozempic 0.5 mg, plan to increase to 1 mg next week, PCP has already sent  in script. Will follow up next week to assess FBG    PLAN  Increase Ozempic to 1 mg QW starting next week  Continue all other medications as prescribed  Follow up in 1 week to assess FBG and need for further medication adjustments              Dayton Luo, PharmD  PGY-1 Pharmacy Resident  [email or web address]   P: [phone]     Continue all meds under the continuation of care with the referring provider and clinical pharmacy team.

## 2023-06-22 ENCOUNTER — TELEMEDICINE (OUTPATIENT)
Dept: PHARMACY | Facility: HOSPITAL | Age: 73
End: 2023-06-22
Payer: MEDICARE

## 2023-06-22 DIAGNOSIS — N18.4 TYPE 2 DIABETES MELLITUS WITH STAGE 4 CHRONIC KIDNEY DISEASE AND HYPERTENSION (MULTI): Primary | ICD-10-CM

## 2023-06-22 DIAGNOSIS — E11.22 TYPE 2 DIABETES MELLITUS WITH STAGE 4 CHRONIC KIDNEY DISEASE AND HYPERTENSION (MULTI): Primary | ICD-10-CM

## 2023-06-22 DIAGNOSIS — I12.9 TYPE 2 DIABETES MELLITUS WITH STAGE 4 CHRONIC KIDNEY DISEASE AND HYPERTENSION (MULTI): Primary | ICD-10-CM

## 2023-06-22 NOTE — ASSESSMENT & PLAN NOTE
Patient has picked up her freestyle manfred sensor and has been recording her BG, readings above. Patient reports all of her high BG readings are post prandial, generally above goal of <180. Given we have not titrated patient to 1 mg Ozempic, will hold increasing glipizide for now. Plan to follow up in 2 weeks to assess adherence/efficacy of new Ozempic dose. If sugars worsen will consider increasing glipizide.     PLAN  Increase Ozempic to 1 mg QW -> patient's first dose today  Continue all other medications as prescribed  Follow up in 2 weeks to assess efficacy/adherence

## 2023-06-22 NOTE — PROGRESS NOTES
Subjective   Patient ID: Renee Candelario is a 73 y.o. female who presents for No chief complaint on file..    Referring Provider: MADELIN Najera     HPI    Review of Systems    Objective     There were no vitals taken for this visit.     Labs  Lab Results   Component Value Date    BILITOT 0.3 04/14/2023    CALCIUM 9.8 04/14/2023    CO2 21 04/14/2023     04/14/2023    CREATININE 1.95 (H) 04/14/2023    GLUCOSE 355 (H) 04/14/2023    ALKPHOS 118 04/14/2023    K 4.6 04/14/2023    PROT 7.1 04/14/2023     04/14/2023    AST 12 04/14/2023    ALT 12 04/14/2023    BUN 25 (H) 04/14/2023    ANIONGAP 20 04/14/2023    ALBUMIN 4.1 04/14/2023    LIPASE 36 07/01/2019    GFRF 27 (A) 04/14/2023     Lab Results   Component Value Date    TRIG 279 (H) 04/14/2023    CHOL 188 04/14/2023    HDL 49.2 04/14/2023     Lab Results   Component Value Date    HGBA1C 10.7 (A) 04/14/2023       Current Outpatient Medications on File Prior to Visit   Medication Sig Dispense Refill    albuterol 90 mcg/actuation inhaler Inhale 2 puffs every 6 hours if needed for wheezing or shortness of breath.      aspirin 81 mg EC tablet Take 1 tablet (81 mg) by mouth once daily.      atorvastatin (Lipitor) 40 mg tablet Take 1 tablet (40 mg) by mouth once daily. 90 tablet 0    dapagliflozin (Farxiga) 10 mg Take 1 tablet (10 mg) by mouth once daily. 90 tablet 0    fluticasone propion-salmeteroL (Advair Diskus) 250-50 mcg/dose diskus inhaler Inhale 1 puff  in the morning and 1 puff in the evening. 180 each 0    FreeStyle Scarlet sensor system (FreeStyle Scarlet 2 Sensor) kit Use as instructed. Replace sensor every 14 days 2 each 3    glipiZIDE XL (Glucotrol XL) 10 mg 24 hr tablet Take 1 tablet (10 mg) by mouth once daily with a meal. 90 tablet 0    losartan (Cozaar) 50 mg tablet Take 1 tablet (50 mg) by mouth once daily. 90 tablet 0    metoprolol succinate XL (Toprol-XL) 50 mg 24 hr tablet Take 1 tablet (50 mg) by mouth once daily. 90 tablet 0     multivit-min/ferrous fumarate (MULTI VITAMIN ORAL) Take 1 tablet by mouth once daily.      semaglutide (OZEMPIC) 1 mg/dose (4 mg/3 mL) pen injector Inject 1 mg under the skin 1 (one) time per week. 9 mL 0     No current facility-administered medications on file prior to visit.      Patient BG readings      Mornin  Afternoon: 123  Evenin      Mornin  Afternoon: 249  Evenin      Mornin  Afternoon: 217  Evenin      Mornin        Assessment/Plan   Problem List Items Addressed This Visit       Type 2 diabetes mellitus with stage 4 chronic kidney disease and hypertension (CMS/[facility]) - Primary     Patient has picked up her freestyle manfred sensor and has been recording her BG, readings above. Patient reports all of her high BG readings are post prandial, generally above goal of <180. Given we have not titrated patient to 1 mg Ozempic, will hold increasing glipizide for now. Plan to follow up in 2 weeks to assess adherence/efficacy of new Ozempic dose. If sugars worsen will consider increasing glipizide.     PLAN  Increase Ozempic to 1 mg QW -> patient's first dose today  Continue all other medications as prescribed  Follow up in 2 weeks to assess efficacy/adherence              Dayton Luo, PharmD  PGY-1 Pharmacy Resident  [email or web address]   P: [phone]     Continue all meds under the continuation of care with the referring provider and clinical pharmacy team.

## 2023-07-10 DIAGNOSIS — I10 ESSENTIAL HYPERTENSION: ICD-10-CM

## 2023-07-10 RX ORDER — LOSARTAN POTASSIUM 50 MG/1
50 TABLET ORAL DAILY
Qty: 90 TABLET | Refills: 0 | Status: SHIPPED | OUTPATIENT
Start: 2023-07-10 | End: 2023-07-31 | Stop reason: SDUPTHER

## 2023-07-26 ENCOUNTER — APPOINTMENT (OUTPATIENT)
Dept: PRIMARY CARE | Facility: CLINIC | Age: 73
End: 2023-07-26
Payer: MEDICARE

## 2023-07-31 ENCOUNTER — LAB (OUTPATIENT)
Dept: LAB | Facility: LAB | Age: 73
End: 2023-07-31
Payer: MEDICARE

## 2023-07-31 ENCOUNTER — OFFICE VISIT (OUTPATIENT)
Dept: PRIMARY CARE | Facility: CLINIC | Age: 73
End: 2023-07-31
Payer: MEDICARE

## 2023-07-31 VITALS
SYSTOLIC BLOOD PRESSURE: 127 MMHG | DIASTOLIC BLOOD PRESSURE: 81 MMHG | WEIGHT: 277 LBS | BODY MASS INDEX: 46.15 KG/M2 | HEIGHT: 65 IN

## 2023-07-31 DIAGNOSIS — I12.9 TYPE 2 DIABETES MELLITUS WITH STAGE 4 CHRONIC KIDNEY DISEASE AND HYPERTENSION (MULTI): Primary | ICD-10-CM

## 2023-07-31 DIAGNOSIS — E11.22 TYPE 2 DIABETES MELLITUS WITH STAGE 4 CHRONIC KIDNEY DISEASE AND HYPERTENSION (MULTI): Primary | ICD-10-CM

## 2023-07-31 DIAGNOSIS — E66.01 OBESITY, CLASS III, BMI 40-49.9 (MORBID OBESITY) (MULTI): ICD-10-CM

## 2023-07-31 DIAGNOSIS — N18.4 CKD (CHRONIC KIDNEY DISEASE) STAGE 4, GFR 15-29 ML/MIN (MULTI): ICD-10-CM

## 2023-07-31 DIAGNOSIS — N18.4 TYPE 2 DIABETES MELLITUS WITH STAGE 4 CHRONIC KIDNEY DISEASE AND HYPERTENSION (MULTI): Primary | ICD-10-CM

## 2023-07-31 DIAGNOSIS — E78.00 HYPERCHOLESTEROLEMIA: ICD-10-CM

## 2023-07-31 DIAGNOSIS — I10 ESSENTIAL HYPERTENSION: ICD-10-CM

## 2023-07-31 DIAGNOSIS — N18.31 TYPE 2 DIABETES MELLITUS WITH STAGE 3A CHRONIC KIDNEY DISEASE, WITHOUT LONG-TERM CURRENT USE OF INSULIN (MULTI): ICD-10-CM

## 2023-07-31 DIAGNOSIS — N39.0 ACUTE UTI: Primary | ICD-10-CM

## 2023-07-31 DIAGNOSIS — Z12.11 COLON CANCER SCREENING: ICD-10-CM

## 2023-07-31 DIAGNOSIS — J45.40 MODERATE PERSISTENT ASTHMA WITHOUT COMPLICATION (HHS-HCC): ICD-10-CM

## 2023-07-31 DIAGNOSIS — E11.22 TYPE 2 DIABETES MELLITUS WITH STAGE 3A CHRONIC KIDNEY DISEASE, WITHOUT LONG-TERM CURRENT USE OF INSULIN (MULTI): ICD-10-CM

## 2023-07-31 LAB
ALBUMIN (G/DL) IN SER/PLAS: 4 G/DL (ref 3.4–5)
ANION GAP IN SER/PLAS: 17 MMOL/L (ref 10–20)
APPEARANCE, URINE: ABNORMAL
BACTERIA, URINE: ABNORMAL /HPF
BILIRUBIN, URINE: NEGATIVE
BLOOD, URINE: ABNORMAL
CALCIUM (MG/DL) IN SER/PLAS: 9.9 MG/DL (ref 8.6–10.6)
CARBON DIOXIDE, TOTAL (MMOL/L) IN SER/PLAS: 24 MMOL/L (ref 21–32)
CHLORIDE (MMOL/L) IN SER/PLAS: 104 MMOL/L (ref 98–107)
COLOR, URINE: YELLOW
CREATININE (MG/DL) IN SER/PLAS: 1.52 MG/DL (ref 0.5–1.05)
GFR FEMALE: 36 ML/MIN/1.73M2
GLUCOSE (MG/DL) IN SER/PLAS: 170 MG/DL (ref 74–99)
GLUCOSE, URINE: ABNORMAL MG/DL
KETONES, URINE: NEGATIVE MG/DL
LEUKOCYTE ESTERASE, URINE: ABNORMAL
MUCUS, URINE: ABNORMAL /LPF
NITRITE, URINE: POSITIVE
PH, URINE: 5 (ref 5–8)
PHOSPHATE (MG/DL) IN SER/PLAS: 4.7 MG/DL (ref 2.5–4.9)
POC HEMOGLOBIN A1C: 8.7 % (ref 4.2–6.5)
POTASSIUM (MMOL/L) IN SER/PLAS: 4.2 MMOL/L (ref 3.5–5.3)
PROTEIN, URINE: NEGATIVE MG/DL
RBC, URINE: 5 /HPF (ref 0–5)
SODIUM (MMOL/L) IN SER/PLAS: 141 MMOL/L (ref 136–145)
SPECIFIC GRAVITY, URINE: 1.02 (ref 1–1.03)
SQUAMOUS EPITHELIAL CELLS, URINE: 6 /HPF
UREA NITROGEN (MG/DL) IN SER/PLAS: 23 MG/DL (ref 6–23)
UROBILINOGEN, URINE: <2 MG/DL (ref 0–1.9)
WBC, URINE: 23 /HPF (ref 0–5)

## 2023-07-31 PROCEDURE — 4010F ACE/ARB THERAPY RXD/TAKEN: CPT

## 2023-07-31 PROCEDURE — 3074F SYST BP LT 130 MM HG: CPT

## 2023-07-31 PROCEDURE — 1159F MED LIST DOCD IN RCRD: CPT

## 2023-07-31 PROCEDURE — 80069 RENAL FUNCTION PANEL: CPT

## 2023-07-31 PROCEDURE — 81001 URINALYSIS AUTO W/SCOPE: CPT

## 2023-07-31 PROCEDURE — 36415 COLL VENOUS BLD VENIPUNCTURE: CPT

## 2023-07-31 PROCEDURE — 3079F DIAST BP 80-89 MM HG: CPT

## 2023-07-31 PROCEDURE — 3046F HEMOGLOBIN A1C LEVEL >9.0%: CPT

## 2023-07-31 PROCEDURE — 1160F RVW MEDS BY RX/DR IN RCRD: CPT

## 2023-07-31 PROCEDURE — 83036 HEMOGLOBIN GLYCOSYLATED A1C: CPT

## 2023-07-31 PROCEDURE — 1036F TOBACCO NON-USER: CPT

## 2023-07-31 PROCEDURE — 99214 OFFICE O/P EST MOD 30 MIN: CPT

## 2023-07-31 RX ORDER — METOPROLOL SUCCINATE 50 MG/1
50 TABLET, EXTENDED RELEASE ORAL DAILY
Qty: 90 TABLET | Refills: 0 | Status: SHIPPED | OUTPATIENT
Start: 2023-07-31 | End: 2023-11-29 | Stop reason: SDUPTHER

## 2023-07-31 RX ORDER — SEMAGLUTIDE 2.68 MG/ML
2 INJECTION, SOLUTION SUBCUTANEOUS
Qty: 9 ML | Refills: 0 | Status: SHIPPED | OUTPATIENT
Start: 2023-07-31 | End: 2023-11-02 | Stop reason: SDUPTHER

## 2023-07-31 RX ORDER — GLIPIZIDE 10 MG/1
10 TABLET, FILM COATED, EXTENDED RELEASE ORAL
Qty: 90 TABLET | Refills: 0 | Status: SHIPPED | OUTPATIENT
Start: 2023-07-31 | End: 2023-11-29 | Stop reason: SDUPTHER

## 2023-07-31 RX ORDER — FLASH GLUCOSE SCANNING READER
EACH MISCELLANEOUS
COMMUNITY
Start: 2023-06-14

## 2023-07-31 RX ORDER — DAPAGLIFLOZIN 10 MG/1
10 TABLET, FILM COATED ORAL DAILY
Qty: 90 TABLET | Refills: 0 | Status: SHIPPED | OUTPATIENT
Start: 2023-07-31 | End: 2023-11-02 | Stop reason: SDUPTHER

## 2023-07-31 RX ORDER — ATORVASTATIN CALCIUM 40 MG/1
40 TABLET, FILM COATED ORAL DAILY
Qty: 90 TABLET | Refills: 0 | Status: SHIPPED | OUTPATIENT
Start: 2023-07-31 | End: 2023-10-09 | Stop reason: SDUPTHER

## 2023-07-31 RX ORDER — LOSARTAN POTASSIUM 50 MG/1
50 TABLET ORAL DAILY
Qty: 90 TABLET | Refills: 0 | Status: SHIPPED | OUTPATIENT
Start: 2023-07-31 | End: 2023-10-09 | Stop reason: SDUPTHER

## 2023-07-31 ASSESSMENT — PATIENT HEALTH QUESTIONNAIRE - PHQ9
SUM OF ALL RESPONSES TO PHQ9 QUESTIONS 1 AND 2: 0
2. FEELING DOWN, DEPRESSED OR HOPELESS: NOT AT ALL
1. LITTLE INTEREST OR PLEASURE IN DOING THINGS: NOT AT ALL

## 2023-07-31 NOTE — PROGRESS NOTES
"Subjective   Patient ID: Renee Candelario is a 73 y.o. female who presents for Follow-up.  HPI  Renee Candelario is a 73 y.o. female who presents for Follow-up.    73 year old female with PMH of T2DM, HTN, HLD presents for follow up.   DM2: glipizide XL 10mg, farxiga 10mg, and ozempic 1mg weekly. Uses CGM. Blood sugars improving. A1C 4/14/23 10.7 --> 7/31/23 8.7.   Stopped metformin d/t low GFR.   HTN: currently takes losartan 50mg, metoprolol 50mg  HLD: On atorvastatin 40mg daily  CKD: stage four, GFR 27, needs kidney ultrasound, hasn't scheduled yet. Has appointment scheduled with nephrology on 9/8/23.   Asthma: Advair diskus BID, albuterol PRN.     All systems have been reviewed and are negative for complaint other than those mentioned in the HPI.     Objective   /81 (BP Location: Left arm, Patient Position: Sitting, BP Cuff Size: Large adult)   Ht 1.651 m (5' 5\")   Wt 126 kg (277 lb)   BMI 46.10 kg/m²    Physical Exam  Constitutional:       General: She is awake.      Appearance: Normal appearance.   HENT:      Head: Normocephalic and atraumatic.   Eyes:      Extraocular Movements: Extraocular movements intact.      Pupils: Pupils are equal, round, and reactive to light.   Cardiovascular:      Rate and Rhythm: Normal rate and regular rhythm.      Heart sounds: S1 normal and S2 normal. No murmur heard.  Pulmonary:      Effort: Pulmonary effort is normal.      Breath sounds: Normal breath sounds.   Musculoskeletal:      Cervical back: Normal range of motion and neck supple.      Right lower leg: No edema.      Left lower leg: No edema.   Skin:     General: Skin is warm and dry.   Neurological:      General: No focal deficit present.      Mental Status: She is alert and oriented to person, place, and time.   Psychiatric:         Mood and Affect: Mood and affect normal.         Behavior: Behavior normal. Behavior is cooperative.         Thought Content: Thought content normal.         Judgment: Judgment " normal.     Renee was seen today for follow-up.  Diagnoses and all orders for this visit:  Type 2 diabetes mellitus with stage 4 chronic kidney disease and hypertension (CMS/HCC) (Primary)  -     A1C today improved from 10.7 to 8.7. Moving in correct direction but still above goal.   - Increase Ozempic from 1mg to 2mg weekly  - Continue Glipizide  - POCT glycosylated hemoglobin (Hb A1C) manually resulted  -     dapagliflozin propanediol (Farxiga) 10 mg; Take 1 tablet (10 mg) by mouth once daily.  -     semaglutide (Ozempic) 2 mg/dose (8 mg/3 mL) pen injector; Inject 2 mg under the skin 1 (one) time per week.  -     glipiZIDE XL (Glucotrol XL) 10 mg 24 hr tablet; Take 1 tablet (10 mg) by mouth once daily with a meal.  Essential hypertension  -     At goal, continue current medication  - metoprolol succinate XL (Toprol-XL) 50 mg 24 hr tablet; Take 1 tablet (50 mg) by mouth once daily.  -     losartan (Cozaar) 50 mg tablet; Take 1 tablet (50 mg) by mouth once daily.  Hypercholesterolemia  -     Last LDL 80, continue current medication  - atorvastatin (Lipitor) 40 mg tablet; Take 1 tablet (40 mg) by mouth once daily.  Obesity, Class III, BMI 40-49.9 (morbid obesity) (CMS/[facility])   - On ozempic, increasing to 2mg weekly.    - Diet and exercise discussed  CKD (chronic kidney disease) stage 4, GFR 15-29 ml/min (CMS/[facility])  -     Has not scheduled kidney ultrasound, will re-order and encourage patient to schedule  - Has nephrology appointment scheduled, encouraged patient to keep appointment  - US renal complete; Future  -     Renal function panel; Future  -     Urinalysis with Reflex Microscopic; Future  Moderate persistent asthma without complication   - Stable, at goal   - Well controlled when taking advair diskus, often skips doses and then requires use of rescue inhaler   - Discussed strategies for compliance, stressed importance  Colon cancer screening  -     Cologuard® colon cancer screening; Future    The patient  received Provided instructions on dietary changes  Provided instructions on exercise  Advised to Increase physical activity because they have an above normal BMI.    Follow up in 3 months.

## 2023-08-01 RX ORDER — AMOXICILLIN AND CLAVULANATE POTASSIUM 875; 125 MG/1; MG/1
875 TABLET, FILM COATED ORAL 2 TIMES DAILY
Qty: 10 TABLET | Refills: 0 | Status: SHIPPED | OUTPATIENT
Start: 2023-08-01 | End: 2023-08-06

## 2023-08-07 DIAGNOSIS — J45.40 MODERATE PERSISTENT ASTHMA WITHOUT COMPLICATION (HHS-HCC): ICD-10-CM

## 2023-08-07 RX ORDER — FLUTICASONE PROPIONATE AND SALMETEROL 250; 50 UG/1; UG/1
1 POWDER RESPIRATORY (INHALATION)
Qty: 180 EACH | Refills: 0 | Status: SHIPPED | OUTPATIENT
Start: 2023-08-07 | End: 2023-11-02 | Stop reason: SDUPTHER

## 2023-09-28 ENCOUNTER — PATIENT OUTREACH (OUTPATIENT)
Dept: PRIMARY CARE | Facility: CLINIC | Age: 73
End: 2023-09-28
Payer: MEDICARE

## 2023-09-28 DIAGNOSIS — R19.7 DIARRHEA, UNSPECIFIED TYPE: ICD-10-CM

## 2023-09-28 NOTE — PROGRESS NOTES
Discharge Facility: [facility] [place]  Discharge Diagnosis: Diarrhea  Admission Date: 9/23/2023  Discharge Date: 9/27/2023    PCP Appointment Date: 9/29/2023 1300  Specialist Appointment Date: Neurosurgery to be scheduled  See discharge assessment below for further details    Engagement  Call Start Time: 1538 (9/28/2023  3:19 PM)    Medications  Medications reviewed with patient/caregiver?: Yes (Metoprolol increased to 75 mg daily) (9/28/2023  3:19 PM)  Is the patient having any side effects they believe may be caused by any medication additions or changes?: No (9/28/2023  3:19 PM)  Does the patient have all medications ordered at discharge?: Yes (9/28/2023  3:19 PM)  Is the patient taking all medications as directed (includes completed medication regime)?: No (9/28/2023  3:19 PM)  What is preventing the patient from taking all medications as directed?: Desires to consult PCP first (9/28/2023  3:19 PM)    Appointments  Does the patient have a primary care provider?: Yes (9/28/2023  3:19 PM)  Care Management Interventions: Verified appointment date/time/provider (9/28/2023  3:19 PM)  Has the patient kept scheduled appointments due by today?: Not applicable (9/28/2023  3:19 PM)    Self Management  What is the home health agency?: N/A (9/28/2023  3:19 PM)  What Durable Medical Equipment (DME) was ordered?: N/A (9/28/2023  3:19 PM)    Patient Teaching  Does the patient have access to their discharge instructions?: Yes (9/28/2023  3:19 PM)  Care Management Interventions: Reviewed instructions with patient (9/28/2023  3:19 PM)  What is the patient's perception of their health status since discharge?: Improving (Pt states her stools are more formed. Denies diarrhea.) (9/28/2023  3:19 PM)  Is the patient/caregiver able to teach back the hierarchy of who to call/visit for symptoms/problems? PCP, Specialist, Home Health nurse, Urgent Care, ED, 911: Yes (9/28/2023  3:19 PM)    Wrap Up  Wrap Up Additional Comments: Pt with PMHx of  hypertension, CKD stage III, hyperlipidemia, COPD/asthma (on 2L O2 at baseline), NIDDM, PE (secondary to COVID s/p AC) presented to ED for diarrhea and fall. Diarrhea thought to be caused by viral gastroenteritis. Pt had an unwitnessed fall and denied LOC. Pt states she fell on her bottom. CT head showed area of concern calcification vs meningioma. Neurosurgery ordered outpatient MRI and will follow. Social work consult ordered due to EMS concerns for the pt's lack of personal care and condition of the home. Social work spoke with family members and the pt was discharged to home. (9/28/2023  3:19 PM)

## 2023-09-29 ENCOUNTER — OFFICE VISIT (OUTPATIENT)
Dept: PRIMARY CARE | Facility: CLINIC | Age: 73
End: 2023-09-29
Payer: MEDICARE

## 2023-09-29 VITALS
SYSTOLIC BLOOD PRESSURE: 110 MMHG | BODY MASS INDEX: 51.16 KG/M2 | DIASTOLIC BLOOD PRESSURE: 62 MMHG | WEIGHT: 271 LBS | HEIGHT: 61 IN

## 2023-09-29 DIAGNOSIS — E11.22 TYPE 2 DIABETES MELLITUS WITH STAGE 4 CHRONIC KIDNEY DISEASE AND HYPERTENSION (MULTI): ICD-10-CM

## 2023-09-29 DIAGNOSIS — I12.9 TYPE 2 DIABETES MELLITUS WITH STAGE 4 CHRONIC KIDNEY DISEASE AND HYPERTENSION (MULTI): ICD-10-CM

## 2023-09-29 DIAGNOSIS — Z09 HOSPITAL DISCHARGE FOLLOW-UP: Primary | ICD-10-CM

## 2023-09-29 DIAGNOSIS — N18.4 CKD (CHRONIC KIDNEY DISEASE) STAGE 4, GFR 15-29 ML/MIN (MULTI): ICD-10-CM

## 2023-09-29 DIAGNOSIS — N18.4 TYPE 2 DIABETES MELLITUS WITH STAGE 4 CHRONIC KIDNEY DISEASE AND HYPERTENSION (MULTI): ICD-10-CM

## 2023-09-29 PROCEDURE — 3046F HEMOGLOBIN A1C LEVEL >9.0%: CPT

## 2023-09-29 PROCEDURE — 1159F MED LIST DOCD IN RCRD: CPT

## 2023-09-29 PROCEDURE — 1160F RVW MEDS BY RX/DR IN RCRD: CPT

## 2023-09-29 PROCEDURE — 99495 TRANSJ CARE MGMT MOD F2F 14D: CPT

## 2023-09-29 PROCEDURE — 1036F TOBACCO NON-USER: CPT

## 2023-09-29 PROCEDURE — 4010F ACE/ARB THERAPY RXD/TAKEN: CPT

## 2023-09-29 PROCEDURE — 3074F SYST BP LT 130 MM HG: CPT

## 2023-09-29 PROCEDURE — 3078F DIAST BP <80 MM HG: CPT

## 2023-09-29 ASSESSMENT — PATIENT HEALTH QUESTIONNAIRE - PHQ9
1. LITTLE INTEREST OR PLEASURE IN DOING THINGS: NOT AT ALL
2. FEELING DOWN, DEPRESSED OR HOPELESS: NOT AT ALL
SUM OF ALL RESPONSES TO PHQ9 QUESTIONS 1 AND 2: 0

## 2023-09-29 NOTE — PROGRESS NOTES
"Subjective   Patient ID: Renee Candelario is a 73 y.o. female who presents for hosp follow up dischared wed, diarrhea.  HPI  73 year old female with PMH of T2DM, HTN, HLD presents for hospital follow up.     Was hospitalized last week after a viral gastroenteritis, multiple episodes of bowel incontinence, and then fall from dizziness. CT head showed possible calcification vs meningioma vs less likely SDH. No neuro symptoms, had no Loc, says she did not hit her head. Has follow up scheduled with [facility].     DM2: glipizide XL 10mg, farxiga 10mg, and ozempic 2mg weekly. Uses CGM. Blood sugars improving. A1C 4/14/23 10.7 --> 7/31/23 8.7. Continues to refuse insulin.   Stopped metformin d/t low GFR.   HTN: currently takes losartan 50mg, metoprolol 50mg  HLD: On atorvastatin 40mg daily  CKD: stage four, GFR 27, needs kidney ultrasound, hasn't scheduled yet. Has appointment scheduled with nephrology on 9/8/23.   Asthma: Advair diskus BID, albuterol PRN.     All systems have been reviewed and are negative for complaint other than those mentioned in the HPI.     Objective   /62 (BP Location: Left arm, Patient Position: Sitting, BP Cuff Size: Large adult)   Ht 1.549 m (5' 1\")   Wt 123 kg (271 lb)   BMI 51.21 kg/m²    Physical Exam  Constitutional:       General: She is awake.      Appearance: Normal appearance.   HENT:      Head: Normocephalic and atraumatic.   Eyes:      Extraocular Movements: Extraocular movements intact.      Pupils: Pupils are equal, round, and reactive to light.   Cardiovascular:      Rate and Rhythm: Normal rate and regular rhythm.      Heart sounds: S1 normal and S2 normal. No murmur heard.  Pulmonary:      Effort: Pulmonary effort is normal.      Breath sounds: Normal breath sounds.   Musculoskeletal:      Cervical back: Normal range of motion and neck supple.      Right lower leg: No edema.      Left lower leg: No edema.   Skin:     General: Skin is warm and dry.   Neurological:      General: No " focal deficit present.      Mental Status: She is alert and oriented to person, place, and time.      Cranial Nerves: No cranial nerve deficit.      Sensory: No sensory deficit.      Motor: No weakness.      Coordination: Coordination normal.      Gait: Gait normal.      Deep Tendon Reflexes: Reflexes normal.   Psychiatric:         Mood and Affect: Mood and affect normal.         Behavior: Behavior normal. Behavior is cooperative.         Thought Content: Thought content normal.         Judgment: Judgment normal.     Renee was seen today for hosp follow up dischared wed, diarrhea.  Diagnoses and all orders for this visit:  Hospital discharge follow-up (Primary)   - Had viral gastroenteritis, dehydration, fell. Went to [facility] ER, had CT head which showed possible calcification vs meningioma vs less likely SDH. Recommended out patient MRI, patient has follow up scheduled with [facility] neurosurgery, encouraged patient to keep appointment.    - Bowel movements formed, no residual symptoms   - No abd pain   - No neuro symptoms, no neuro deficit noted.   Type 2 diabetes mellitus with stage 4 chronic kidney disease and hypertension (CMS/HCC)  -     Has been having poor compliance with CGM,  was out of batteries for a long period of time and therefore has not been checking  - Due for A1C repeat in 1 month  - Reports compliance with medications, watching diet   - Restart checking BG. Will reestablish care with pharmacy team as well. Continue current medications .  - Referral to Clinical Pharmacy; Future  CKD stage 4   - GFR 24   - Needs to establish care with nephrology, reminded to do so, patient will call to make appointment.

## 2023-09-29 NOTE — PATIENT INSTRUCTIONS
Please call [phone] to schedule an appointment with nephrology (kidney doctor).         Ways to Help Prevent Falls at Home    Quick Tips   ? Ask for help if you need it. Most people want to help!   ? Get up slowly after sitting or laying down   ? Wear a medical alert device or keep cell phone in your pocket   ? Use night lights, especially areas near a bathroom   ? Keep the items you use often within reach on a small stool or end table   ? Use an assistive device such as walker or cane, as directed by provider/physical therapy   ? Use a non-slip mat and grab bars in your bathroom. Look for home health sections for best options     Other Areas to Focus On   ? Exercise and nutrition: Regular exercise or taking a falls prevention class are great ways improve strength and balance. Don’t forget to stay hydrated and bring a snack!   ? Medicine side effects: Some medicines can make you sleepy or dizzy, which could cause a fall. Ask your healthcare provider about the side effects your medicines could cause. Be sure to let them know if you take any vitamins or supplements as well.   ? Tripping hazards: Remove items you could trip on, such as loose mats, rugs, cords, and clutter. Wear closed toe shoes with rubber soles.   ? Health and wellness: Get regular checkups with your healthcare provider, plus routine vision and hearing screenings. Talk with your healthcare provider about:   o Your medicines and the possible side effects - bring them in a bag if that is easier!   o Problems with balance or feeling dizzy   o Ways to promote bone health, such as Vitamin D and calcium supplements   o Questions or concerns about falling     *Ask your healthcare team if you have questions     [facility], 2022

## 2023-10-09 ENCOUNTER — TELEMEDICINE (OUTPATIENT)
Dept: PHARMACY | Facility: HOSPITAL | Age: 73
End: 2023-10-09
Payer: MEDICARE

## 2023-10-09 DIAGNOSIS — E78.00 HYPERCHOLESTEROLEMIA: ICD-10-CM

## 2023-10-09 DIAGNOSIS — I10 ESSENTIAL HYPERTENSION: ICD-10-CM

## 2023-10-09 DIAGNOSIS — N18.4 TYPE 2 DIABETES MELLITUS WITH STAGE 4 CHRONIC KIDNEY DISEASE AND HYPERTENSION (MULTI): ICD-10-CM

## 2023-10-09 DIAGNOSIS — E11.22 TYPE 2 DIABETES MELLITUS WITH STAGE 4 CHRONIC KIDNEY DISEASE AND HYPERTENSION (MULTI): ICD-10-CM

## 2023-10-09 DIAGNOSIS — I12.9 TYPE 2 DIABETES MELLITUS WITH STAGE 4 CHRONIC KIDNEY DISEASE AND HYPERTENSION (MULTI): ICD-10-CM

## 2023-10-09 RX ORDER — FLASH GLUCOSE SENSOR
KIT MISCELLANEOUS
Qty: 2 EACH | Refills: 11 | Status: SHIPPED | OUTPATIENT
Start: 2023-10-09

## 2023-10-09 RX ORDER — LOSARTAN POTASSIUM 50 MG/1
50 TABLET ORAL DAILY
Qty: 90 TABLET | Refills: 1 | Status: SHIPPED | OUTPATIENT
Start: 2023-10-09 | End: 2023-11-29 | Stop reason: SDUPTHER

## 2023-10-09 RX ORDER — ATORVASTATIN CALCIUM 40 MG/1
40 TABLET, FILM COATED ORAL DAILY
Qty: 90 TABLET | Refills: 1 | Status: SHIPPED | OUTPATIENT
Start: 2023-10-09 | End: 2023-10-31 | Stop reason: DRUGHIGH

## 2023-10-09 NOTE — ASSESSMENT & PLAN NOTE
Patient's diabetes is currently poorly controlled, as shown by most recent A1c of 8.7% (goal <7.5%). Her A1c did decrease from 10.7% in April. Patient is using a Freestyle Scarlet CGM to monitor her blood sugars. We discussed proper blood glucose goals for fasting and post-meal values, including when she should be testing after her meals (~2 hours). Patient is agreeable to checking her sugars at these specific times.   We discuss her current medication therapy. Patient has cost issues with her Ozempic and Farxiga. We discussed [facility] PAP screening, but patient believes this has already been done and that she does not qualify. This author does not see any mention of this in previous pharmacy notes. We will consider re-screening at follow up visit.     Plan:  CONTINUE Ozempic 2mg weekly, Farxiga 10 mg daily, and glipizide XL 10 mg  Patient requests refill today on her losartan, atorvastatin and FreeStyle Scarlet Sensors. Will be sent to [facility] per patient request.

## 2023-10-09 NOTE — PROGRESS NOTES
Subjective   Patient ID: Renee Candelario is a 73 y.o. female who presents for Diabetes.    Referring Provider: MADELIN Najera     Diabetes  She presents for her initial diabetic visit. She has type 2 diabetes mellitus. There are no hypoglycemic associated symptoms. Current diabetic treatments: Farxiga 10mg, glipizide XL 10 mg, Ozempic 2 mg. Her breakfast blood glucose range is generally 110-130 mg/dl. (States that her average is around 125-130, however, she does have some readings that are around 200. ) An ACE inhibitor/angiotensin II receptor blocker is being taken.       Objective     There were no vitals taken for this visit.     Labs  Lab Results   Component Value Date    BILITOT 0.3 04/14/2023    CALCIUM 9.9 07/31/2023    CO2 24 07/31/2023     07/31/2023    CREATININE 1.52 (H) 07/31/2023    GLUCOSE 170 (H) 07/31/2023    ALKPHOS 118 04/14/2023    K 4.2 07/31/2023    PROT 7.1 04/14/2023     07/31/2023    AST 12 04/14/2023    ALT 12 04/14/2023    BUN 23 07/31/2023    ANIONGAP 17 07/31/2023    PHOS 4.7 07/31/2023    ALBUMIN 4.0 07/31/2023    LIPASE 36 07/01/2019    GFRF 36 (A) 07/31/2023     Lab Results   Component Value Date    TRIG 279 (H) 04/14/2023    CHOL 188 04/14/2023    HDL 49.2 04/14/2023     Lab Results   Component Value Date    HGBA1C 8.7 (A) 07/31/2023       Current Outpatient Medications on File Prior to Visit   Medication Sig Dispense Refill    albuterol 90 mcg/actuation inhaler Inhale 2 puffs every 6 hours if needed for wheezing or shortness of breath.      aspirin 81 mg EC tablet Take 1 tablet (81 mg) by mouth once daily.      atorvastatin (Lipitor) 40 mg tablet Take 1 tablet (40 mg) by mouth once daily. 90 tablet 0    dapagliflozin propanediol (Farxiga) 10 mg Take 1 tablet (10 mg) by mouth once daily. 90 tablet 0    fluticasone propion-salmeteroL (Advair Diskus) 250-50 mcg/dose diskus inhaler Inhale 1 puff 2 times a day. 180 each 0    FreeStyle Scarlet 2 [place] misc USE TWICE  DAILY      FreeStyle Scarlet sensor system (FreeStyle Scarlet 2 Sensor) kit Use as instructed. Replace sensor every 14 days 2 each 3    glipiZIDE XL (Glucotrol XL) 10 mg 24 hr tablet Take 1 tablet (10 mg) by mouth once daily with a meal. 90 tablet 0    losartan (Cozaar) 50 mg tablet Take 1 tablet (50 mg) by mouth once daily. 90 tablet 0    metoprolol succinate XL (Toprol-XL) 50 mg 24 hr tablet Take 1 tablet (50 mg) by mouth once daily. 90 tablet 0    multivit-min/ferrous fumarate (MULTI VITAMIN ORAL) Take 1 tablet by mouth once daily.      semaglutide (Ozempic) 2 mg/dose (8 mg/3 mL) pen injector Inject 2 mg under the skin 1 (one) time per week. 9 mL 0     No current facility-administered medications on file prior to visit.        Assessment/Plan   Problem List Items Addressed This Visit       Essential hypertension    Hypercholesterolemia    Type 2 diabetes mellitus with stage 4 chronic kidney disease and hypertension (CMS/[facility])     Patient's diabetes is currently poorly controlled, as shown by most recent A1c of 8.7% (goal <7.5%). Her A1c did decrease from 10.7% in April. Patient is using a Freestyle Scarlet CGM to monitor her blood sugars. We discussed proper blood glucose goals for fasting and post-meal values, including when she should be testing after her meals (~2 hours). Patient is agreeable to checking her sugars at these specific times.   We discuss her current medication therapy. Patient has cost issues with her Ozempic and Farxiga. We discussed  PAP screening, but patient believes this has already been done and that she does not qualify. This author does not see any mention of this in previous pharmacy notes. We will consider re-screening at follow up visit.     Plan:  CONTINUE Ozempic 2mg weekly, Farxiga 10 mg daily, and glipizide XL 10 mg  Patient requests refill today on her losartan, atorvastatin and FreeStyle Scarlet Sensors. Will be sent to [facility] per patient request.           Follow Up: 11/6 @ 1pm    -Patient to get updated A1c at the end of October.  -Re-screen for [facility] P[facility]  -Consider switching Ozempic to Mounjaro for more effective A1c lowering and weight loss effects.     Jacque Garcia, PharmD    Continue all meds under the continuation of care with the referring provider and clinical pharmacy team.

## 2023-10-11 ENCOUNTER — PATIENT OUTREACH (OUTPATIENT)
Dept: PRIMARY CARE | Facility: CLINIC | Age: 73
End: 2023-10-11
Payer: MEDICARE

## 2023-10-11 NOTE — PROGRESS NOTES
Unable to reach patient for call back after patient's follow up appointment with Jing Boykin CNP. M reminding patient of upcoming appt on 10/30/2023 13:00 with Jing and call back number for patient to call if needed.

## 2023-10-30 ENCOUNTER — OFFICE VISIT (OUTPATIENT)
Dept: PRIMARY CARE | Facility: CLINIC | Age: 73
End: 2023-10-30
Payer: MEDICARE

## 2023-10-30 ENCOUNTER — LAB (OUTPATIENT)
Dept: LAB | Facility: LAB | Age: 73
End: 2023-10-30
Payer: MEDICARE

## 2023-10-30 VITALS
SYSTOLIC BLOOD PRESSURE: 137 MMHG | BODY MASS INDEX: 44.65 KG/M2 | HEIGHT: 65 IN | DIASTOLIC BLOOD PRESSURE: 84 MMHG | WEIGHT: 268 LBS

## 2023-10-30 DIAGNOSIS — I12.9 TYPE 2 DIABETES MELLITUS WITH STAGE 4 CHRONIC KIDNEY DISEASE AND HYPERTENSION (MULTI): Primary | ICD-10-CM

## 2023-10-30 DIAGNOSIS — N18.4 TYPE 2 DIABETES MELLITUS WITH STAGE 4 CHRONIC KIDNEY DISEASE AND HYPERTENSION (MULTI): ICD-10-CM

## 2023-10-30 DIAGNOSIS — N18.4 CKD (CHRONIC KIDNEY DISEASE) STAGE 4, GFR 15-29 ML/MIN (MULTI): ICD-10-CM

## 2023-10-30 DIAGNOSIS — E78.00 HYPERCHOLESTEROLEMIA: ICD-10-CM

## 2023-10-30 DIAGNOSIS — E66.01 CLASS 3 SEVERE OBESITY DUE TO EXCESS CALORIES WITH SERIOUS COMORBIDITY AND BODY MASS INDEX (BMI) OF 50.0 TO 59.9 IN ADULT (MULTI): ICD-10-CM

## 2023-10-30 DIAGNOSIS — E11.22 TYPE 2 DIABETES MELLITUS WITH STAGE 4 CHRONIC KIDNEY DISEASE AND HYPERTENSION (MULTI): ICD-10-CM

## 2023-10-30 DIAGNOSIS — N18.4 TYPE 2 DIABETES MELLITUS WITH STAGE 4 CHRONIC KIDNEY DISEASE AND HYPERTENSION (MULTI): Primary | ICD-10-CM

## 2023-10-30 DIAGNOSIS — I12.9 TYPE 2 DIABETES MELLITUS WITH STAGE 4 CHRONIC KIDNEY DISEASE AND HYPERTENSION (MULTI): ICD-10-CM

## 2023-10-30 DIAGNOSIS — I10 ESSENTIAL HYPERTENSION: ICD-10-CM

## 2023-10-30 DIAGNOSIS — E11.22 TYPE 2 DIABETES MELLITUS WITH STAGE 4 CHRONIC KIDNEY DISEASE AND HYPERTENSION (MULTI): Primary | ICD-10-CM

## 2023-10-30 DIAGNOSIS — K21.9 GASTROESOPHAGEAL REFLUX DISEASE WITHOUT ESOPHAGITIS: ICD-10-CM

## 2023-10-30 LAB
ALBUMIN SERPL BCP-MCNC: 4.1 G/DL (ref 3.4–5)
ALP SERPL-CCNC: 105 U/L (ref 33–136)
ALT SERPL W P-5'-P-CCNC: 14 U/L (ref 7–45)
ANION GAP SERPL CALC-SCNC: 18 MMOL/L (ref 10–20)
AST SERPL W P-5'-P-CCNC: 11 U/L (ref 9–39)
BILIRUB SERPL-MCNC: 0.3 MG/DL (ref 0–1.2)
BUN SERPL-MCNC: 27 MG/DL (ref 6–23)
CALCIUM SERPL-MCNC: 9.5 MG/DL (ref 8.6–10.6)
CHLORIDE SERPL-SCNC: 106 MMOL/L (ref 98–107)
CHOLEST SERPL-MCNC: 188 MG/DL (ref 0–199)
CHOLESTEROL/HDL RATIO: 4.3
CO2 SERPL-SCNC: 22 MMOL/L (ref 21–32)
CREAT SERPL-MCNC: 1.99 MG/DL (ref 0.5–1.05)
CREAT UR-MCNC: 70.6 MG/DL (ref 20–320)
EST. AVERAGE GLUCOSE BLD GHB EST-MCNC: 174 MG/DL
GFR SERPL CREATININE-BSD FRML MDRD: 26 ML/MIN/1.73M*2
GLUCOSE SERPL-MCNC: 185 MG/DL (ref 74–99)
HBA1C MFR BLD: 7.7 %
HDLC SERPL-MCNC: 43.7 MG/DL
LDLC SERPL CALC-MCNC: 110 MG/DL
MICROALBUMIN UR-MCNC: 16.8 MG/L
MICROALBUMIN/CREAT UR: 23.8 UG/MG CREAT
NON HDL CHOLESTEROL: 144 MG/DL (ref 0–149)
POTASSIUM SERPL-SCNC: 4.3 MMOL/L (ref 3.5–5.3)
PROT SERPL-MCNC: 7.3 G/DL (ref 6.4–8.2)
SODIUM SERPL-SCNC: 142 MMOL/L (ref 136–145)
TRIGL SERPL-MCNC: 171 MG/DL (ref 0–149)
VLDL: 34 MG/DL (ref 0–40)

## 2023-10-30 PROCEDURE — 3075F SYST BP GE 130 - 139MM HG: CPT

## 2023-10-30 PROCEDURE — 99214 OFFICE O/P EST MOD 30 MIN: CPT

## 2023-10-30 PROCEDURE — 1159F MED LIST DOCD IN RCRD: CPT

## 2023-10-30 PROCEDURE — 4010F ACE/ARB THERAPY RXD/TAKEN: CPT

## 2023-10-30 PROCEDURE — 36415 COLL VENOUS BLD VENIPUNCTURE: CPT

## 2023-10-30 PROCEDURE — 80061 LIPID PANEL: CPT

## 2023-10-30 PROCEDURE — 1036F TOBACCO NON-USER: CPT

## 2023-10-30 PROCEDURE — 80053 COMPREHEN METABOLIC PANEL: CPT

## 2023-10-30 PROCEDURE — 3046F HEMOGLOBIN A1C LEVEL >9.0%: CPT

## 2023-10-30 PROCEDURE — 3049F LDL-C 100-129 MG/DL: CPT

## 2023-10-30 PROCEDURE — 3079F DIAST BP 80-89 MM HG: CPT

## 2023-10-30 PROCEDURE — 83036 HEMOGLOBIN GLYCOSYLATED A1C: CPT

## 2023-10-30 PROCEDURE — 82570 ASSAY OF URINE CREATININE: CPT

## 2023-10-30 PROCEDURE — 82043 UR ALBUMIN QUANTITATIVE: CPT

## 2023-10-30 PROCEDURE — 3008F BODY MASS INDEX DOCD: CPT

## 2023-10-30 PROCEDURE — 1160F RVW MEDS BY RX/DR IN RCRD: CPT

## 2023-10-30 RX ORDER — INSULIN GLARGINE 100 [IU]/ML
10 INJECTION, SOLUTION SUBCUTANEOUS NIGHTLY
Qty: 5 EACH | Refills: 0 | Status: SHIPPED | OUTPATIENT
Start: 2023-10-30 | End: 2023-10-31 | Stop reason: ALTCHOICE

## 2023-10-30 RX ORDER — PEN NEEDLE, DIABETIC 30 GX3/16"
NEEDLE, DISPOSABLE MISCELLANEOUS
Qty: 100 EACH | Refills: 11 | Status: SHIPPED | OUTPATIENT
Start: 2023-10-30 | End: 2023-10-31 | Stop reason: ALTCHOICE

## 2023-10-30 NOTE — PATIENT INSTRUCTIONS
It was nice to see you today.     Please call Jacque at  [phone] to discuss cost savings information for your Ozempic.   You will need to get your tax documents to qualify.

## 2023-10-30 NOTE — PROGRESS NOTES
"Subjective   Patient ID: Renee Candelario is a 73 y.o. female who presents for Follow-up.  HPI  73 year old female with PMH of T2DM, HTN, HLD, CKD stage 4, presents for hospital follow up.     DM2: glipizide XL 10mg, farxiga 10mg, and ozempic 2mg weekly. Uses CGM. Blood sugars improving. A1C 4/14/23 10.7 --> 7/31/23 8.7. Continues to refuse insulin.   Stopped metformin d/t low GFR.   HTN: currently takes losartan 50mg, metoprolol 50mg  HLD: On atorvastatin 40mg daily  CKD: stage four, GFR 27, needs kidney ultrasound, hasn't scheduled yet. Has appointment scheduled with nephrology on 9/8/23.   Asthma: Advair diskus BID, albuterol PRN.     Was hospitalized 1 month ago after a viral gastroenteritis, multiple episodes of bowel incontinence, and then fall from dizziness. CT head showed possible calcification vs meningioma vs less likely SDH. No neuro symptoms, had no Loc, says she did not hit her head. Has follow up scheduled with [facility] on 11/16.      Has lost 20lb since starting ozempic. Results from freestyle manfred reviewed. Sugars running primarily in 200s, no lows.   Reports compliance with ozempic though having difficulty affording the medication.     All systems have been reviewed and are negative for complaint other than those mentioned in the HPI.     Objective   /84 (BP Location: Right arm, Patient Position: Sitting, BP Cuff Size: Large adult)   Ht 1.651 m (5' 5\")   Wt 122 kg (268 lb)   BMI 44.60 kg/m²    Physical Exam  Constitutional:       General: She is awake.      Appearance: Normal appearance.   HENT:      Head: Normocephalic and atraumatic.   Eyes:      Extraocular Movements: Extraocular movements intact.      Pupils: Pupils are equal, round, and reactive to light.   Cardiovascular:      Rate and Rhythm: Normal rate and regular rhythm.      Heart sounds: S1 normal and S2 normal. No murmur heard.  Pulmonary:      Effort: Pulmonary effort is normal.      Breath sounds: Normal breath sounds. " "  Musculoskeletal:      Cervical back: Normal range of motion and neck supple.      Right lower leg: No edema.      Left lower leg: No edema.   Skin:     General: Skin is warm and dry.   Neurological:      General: No focal deficit present.      Mental Status: She is alert and oriented to person, place, and time.   Psychiatric:         Mood and Affect: Mood and affect normal.         Behavior: Behavior normal. Behavior is cooperative.         Thought Content: Thought content normal.         Judgment: Judgment normal.     Renee was seen today for follow-up.  Diagnoses and all orders for this visit:  Type 2 diabetes mellitus with stage 4 chronic kidney disease and hypertension (CMS/[facility]) (Primary)  -     Not at goal, recheck A1C in the lab  - Blood sugars primarily in 200s, no hypoglycemic events  - Reports compliance with ozempic, though it is financially burdensome   - START 10U lantus insulin nightly   - CONTINUE ozempic and farxiga  - Reconnect with  pharmacy team for PAP application. Patient initially declined application because she thought she was previously screened for PAP but has not been.   - Continue monitoring blood glucose using freestyle manfred  - Recommend scheduling appointment with endocrinology for further diabetes management given patient continues to be uncontrolled.   - Hemoglobin A1C; Future  -     insulin glargine (Lantus Solostar U-100 Insulin) 100 unit/mL (3 mL) pen; Inject 10 Units under the skin once daily at bedtime. Take as directed per insulin instructions.  -     pen needle, diabetic 31 gauge x 5/16\" needle; Use to inject 1-4 times daily as directed.  Essential hypertension  -     Near goal, continue current medication   - Comprehensive Metabolic Panel; Future  -     Albumin, urine, random; Future  Hypercholesterolemia  -    Last LDL near goal, continue current medication, recheck today  -  Lipid Panel; Future  Class 3 severe obesity due to excess calories with serious comorbidity " and body mass index (BMI) of 50.0 to 59.9 in adult (CMS/[facility])   - Has lost 20lb since starting ozempic   - Continue current medication   Gastroesophageal reflux disease without esophagitis  - Stable, continue current meds   CKD (chronic kidney disease) stage 4, GFR 15-29 ml/min (CMS/[facility])   - Has not scheduled nephrology appointment or kidney ultrasound   - Likely from years of poorly controlled HTN and DM2. Reminded patient to schedule US and nephro appointments.     Follow up in 1 month to review blood glucose on insulin.

## 2023-10-31 DIAGNOSIS — E78.00 HYPERCHOLESTEROLEMIA: Primary | ICD-10-CM

## 2023-10-31 RX ORDER — ATORVASTATIN CALCIUM 80 MG/1
80 TABLET, FILM COATED ORAL DAILY
Qty: 90 TABLET | Refills: 1 | Status: SHIPPED | OUTPATIENT
Start: 2023-10-31 | End: 2023-11-29 | Stop reason: SDUPTHER

## 2023-11-02 ENCOUNTER — TELEMEDICINE (OUTPATIENT)
Dept: PHARMACY | Facility: HOSPITAL | Age: 73
End: 2023-11-02
Payer: MEDICARE

## 2023-11-02 ENCOUNTER — SPECIALTY PHARMACY (OUTPATIENT)
Dept: PHARMACY | Facility: CLINIC | Age: 73
End: 2023-11-02

## 2023-11-02 ENCOUNTER — PHARMACY VISIT (OUTPATIENT)
Dept: PHARMACY | Facility: CLINIC | Age: 73
End: 2023-11-02
Payer: COMMERCIAL

## 2023-11-02 DIAGNOSIS — E11.22 TYPE 2 DIABETES MELLITUS WITH STAGE 4 CHRONIC KIDNEY DISEASE AND HYPERTENSION (MULTI): ICD-10-CM

## 2023-11-02 DIAGNOSIS — J45.40 MODERATE PERSISTENT ASTHMA WITHOUT COMPLICATION (HHS-HCC): ICD-10-CM

## 2023-11-02 DIAGNOSIS — N18.4 TYPE 2 DIABETES MELLITUS WITH STAGE 4 CHRONIC KIDNEY DISEASE AND HYPERTENSION (MULTI): ICD-10-CM

## 2023-11-02 DIAGNOSIS — I12.9 TYPE 2 DIABETES MELLITUS WITH STAGE 4 CHRONIC KIDNEY DISEASE AND HYPERTENSION (MULTI): ICD-10-CM

## 2023-11-02 PROCEDURE — RXMED WILLOW AMBULATORY MEDICATION CHARGE

## 2023-11-02 RX ORDER — SEMAGLUTIDE 2.68 MG/ML
2 INJECTION, SOLUTION SUBCUTANEOUS
Qty: 3 ML | Refills: 11 | Status: SHIPPED | OUTPATIENT
Start: 2023-11-02 | End: 2024-01-08 | Stop reason: SDUPTHER

## 2023-11-02 RX ORDER — DAPAGLIFLOZIN 10 MG/1
10 TABLET, FILM COATED ORAL DAILY
Qty: 30 TABLET | Refills: 11 | Status: SHIPPED | OUTPATIENT
Start: 2023-11-02 | End: 2024-06-05 | Stop reason: SDUPTHER

## 2023-11-02 RX ORDER — FLUTICASONE PROPIONATE AND SALMETEROL 250; 50 UG/1; UG/1
1 POWDER RESPIRATORY (INHALATION)
Qty: 60 EACH | Refills: 11 | Status: SHIPPED | OUTPATIENT
Start: 2023-11-02 | End: 2024-03-13 | Stop reason: SDUPTHER

## 2023-11-02 NOTE — PROGRESS NOTES
Subjective   Patient ID: Renee Candelario is a 73 y.o. female who presents for Cost Assistance     Referring Provider: Jing Boykin, APRN-CNP       Objective     There were no vitals taken for this visit.     Labs  Lab Results   Component Value Date    BILITOT 0.3 10/30/2023    CALCIUM 9.5 10/30/2023    CO2 22 10/30/2023     10/30/2023    CREATININE 1.99 (H) 10/30/2023    GLUCOSE 185 (H) 10/30/2023    ALKPHOS 105 10/30/2023    K 4.3 10/30/2023    PROT 7.3 10/30/2023     10/30/2023    AST 11 10/30/2023    ALT 14 10/30/2023    BUN 27 (H) 10/30/2023    ANIONGAP 18 10/30/2023    PHOS 4.7 07/31/2023    ALBUMIN 4.1 10/30/2023    LIPASE 36 07/01/2019    GFRF 36 (A) 07/31/2023     Lab Results   Component Value Date    TRIG 171 (H) 10/30/2023    CHOL 188 10/30/2023    LDLCALC 110 (H) 10/30/2023    HDL 43.7 10/30/2023     Lab Results   Component Value Date    HGBA1C 7.7 (H) 10/30/2023       Current Outpatient Medications on File Prior to Visit   Medication Sig Dispense Refill    albuterol 90 mcg/actuation inhaler Inhale 2 puffs every 6 hours if needed for wheezing or shortness of breath.      aspirin 81 mg EC tablet Take 1 tablet (81 mg) by mouth once daily.      atorvastatin (Lipitor) 80 mg tablet Take 1 tablet (80 mg) by mouth once daily. 90 tablet 1    dapagliflozin propanediol (Farxiga) 10 mg Take 1 tablet (10 mg) by mouth once daily. 90 tablet 0    fluticasone propion-salmeteroL (Advair Diskus) 250-50 mcg/dose diskus inhaler Inhale 1 puff 2 times a day. 180 each 0    FreeStyle Scarlet 2 [place] misc USE TWICE DAILY      FreeStyle Scarlet sensor system (FreeStyle Scarlet 2 Sensor) kit Use as instructed. Replace sensor every 14 days 2 each 11    glipiZIDE XL (Glucotrol XL) 10 mg 24 hr tablet Take 1 tablet (10 mg) by mouth once daily with a meal. 90 tablet 0    losartan (Cozaar) 50 mg tablet Take 1 tablet (50 mg) by mouth once daily. 90 tablet 1    metoprolol succinate XL (Toprol-XL) 50 mg 24 hr tablet Take 1 tablet (50  "mg) by mouth once daily. 90 tablet 0    multivit-min/ferrous fumarate (MULTI VITAMIN ORAL) Take 1 tablet by mouth once daily.      semaglutide (Ozempic) 2 mg/dose (8 mg/3 mL) pen injector Inject 2 mg under the skin 1 (one) time per week. 9 mL 0    [DISCONTINUED] atorvastatin (Lipitor) 40 mg tablet Take 1 tablet (40 mg) by mouth once daily. 90 tablet 1    [DISCONTINUED] insulin glargine (Lantus Solostar U-100 Insulin) 100 unit/mL (3 mL) pen Inject 10 Units under the skin once daily at bedtime. Take as directed per insulin instructions. 5 each 0    [DISCONTINUED] pen needle, diabetic 31 gauge x 5/16\" needle Use to inject 1-4 times daily as directed. 100 each 11     No current facility-administered medications on file prior to visit.        Assessment/Plan   Problem List Items Addressed This Visit       Moderate persistent asthma without complication    Type 2 diabetes mellitus with stage 4 chronic kidney disease and hypertension (CMS/[facility])    Patient Assistance Screening (VAF)    Patient verbally reports monthly or yearly income which is less than 400% federal poverty level     Application for program has been submitted for the following medications: Farxiga 10 mg, Ozempic 2mg, Advair     Patient has already submitted necessary documents    Patient aware this process may take up to 6 weeks.     If approved medication must be filled through [facility] pharmacy and may be picked up or mailed to patient.     Pharmacy Follow Up: 11/6 @ 1AM to review labs    Jacque Garcia PharmD    Continue all meds under the continuation of care with the referring provider and clinical pharmacy team.       "

## 2023-11-03 ENCOUNTER — TELEPHONE (OUTPATIENT)
Dept: PRIMARY CARE | Facility: CLINIC | Age: 73
End: 2023-11-03
Payer: MEDICARE

## 2023-11-06 ENCOUNTER — APPOINTMENT (OUTPATIENT)
Dept: PHARMACY | Facility: HOSPITAL | Age: 73
End: 2023-11-06
Payer: MEDICARE

## 2023-11-06 ENCOUNTER — TELEMEDICINE (OUTPATIENT)
Dept: PHARMACY | Facility: HOSPITAL | Age: 73
End: 2023-11-06
Payer: MEDICARE

## 2023-11-06 ENCOUNTER — PHARMACY VISIT (OUTPATIENT)
Dept: PHARMACY | Facility: CLINIC | Age: 73
End: 2023-11-06
Payer: COMMERCIAL

## 2023-11-06 DIAGNOSIS — E11.22 TYPE 2 DIABETES MELLITUS WITH STAGE 4 CHRONIC KIDNEY DISEASE AND HYPERTENSION (MULTI): Primary | ICD-10-CM

## 2023-11-06 DIAGNOSIS — N18.4 TYPE 2 DIABETES MELLITUS WITH STAGE 4 CHRONIC KIDNEY DISEASE AND HYPERTENSION (MULTI): Primary | ICD-10-CM

## 2023-11-06 DIAGNOSIS — I12.9 TYPE 2 DIABETES MELLITUS WITH STAGE 4 CHRONIC KIDNEY DISEASE AND HYPERTENSION (MULTI): Primary | ICD-10-CM

## 2023-11-06 PROCEDURE — RXMED WILLOW AMBULATORY MEDICATION CHARGE

## 2023-11-06 NOTE — PROGRESS NOTES
Subjective   Patient ID: Renee Candelario is a 73 y.o. female who presents for Diabetes    Referring Provider: MADELIN Najera     Diabetes  She presents for her follow-up diabetic visit. She has type 2 diabetes mellitus. Her disease course has been improving. Compliance with diabetes treatment: Farxiga 10 mg, glipizide 10 mg daily, Ozempic 2 mg weekly.         Objective     There were no vitals taken for this visit.     Labs  Lab Results   Component Value Date    BILITOT 0.3 10/30/2023    CALCIUM 9.5 10/30/2023    CO2 22 10/30/2023     10/30/2023    CREATININE 1.99 (H) 10/30/2023    GLUCOSE 185 (H) 10/30/2023    ALKPHOS 105 10/30/2023    K 4.3 10/30/2023    PROT 7.3 10/30/2023     10/30/2023    AST 11 10/30/2023    ALT 14 10/30/2023    BUN 27 (H) 10/30/2023    ANIONGAP 18 10/30/2023    PHOS 4.7 07/31/2023    ALBUMIN 4.1 10/30/2023    LIPASE 36 07/01/2019    GFRF 36 (A) 07/31/2023     Lab Results   Component Value Date    TRIG 171 (H) 10/30/2023    CHOL 188 10/30/2023    LDLCALC 110 (H) 10/30/2023    HDL 43.7 10/30/2023     Lab Results   Component Value Date    HGBA1C 7.7 (H) 10/30/2023       Current Outpatient Medications on File Prior to Visit   Medication Sig Dispense Refill    albuterol 90 mcg/actuation inhaler Inhale 2 puffs every 6 hours if needed for wheezing or shortness of breath.      aspirin 81 mg EC tablet Take 1 tablet (81 mg) by mouth once daily.      atorvastatin (Lipitor) 80 mg tablet Take 1 tablet (80 mg) by mouth once daily. 90 tablet 1    dapagliflozin propanediol (Farxiga) 10 mg Take 1 tablet (10 mg) by mouth once daily. 30 tablet 11    fluticasone propion-salmeteroL (Advair Diskus) 250-50 mcg/dose diskus inhaler Inhale 1 puff 2 times a day. 60 each 11    FreeStyle Scarlet 2 [place] misc USE TWICE DAILY      FreeStyle Scarlet sensor system (FreeStyle Scarlet 2 Sensor) kit Use as instructed. Replace sensor every 14 days 2 each 11    glipiZIDE XL (Glucotrol XL) 10 mg 24 hr tablet Take 1  "tablet (10 mg) by mouth once daily with a meal. 90 tablet 0    losartan (Cozaar) 50 mg tablet Take 1 tablet (50 mg) by mouth once daily. 90 tablet 1    metoprolol succinate XL (Toprol-XL) 50 mg 24 hr tablet Take 1 tablet (50 mg) by mouth once daily. 90 tablet 0    multivit-min/ferrous fumarate (MULTI VITAMIN ORAL) Take 1 tablet by mouth once daily.      semaglutide (Ozempic) 2 mg/dose (8 mg/3 mL) pen injector Inject 2 mg under the skin 1 (one) time per week. 2 mL 11    [DISCONTINUED] atorvastatin (Lipitor) 40 mg tablet Take 1 tablet (40 mg) by mouth once daily. 90 tablet 1    [DISCONTINUED] dapagliflozin propanediol (Farxiga) 10 mg Take 1 tablet (10 mg) by mouth once daily. 90 tablet 0    [DISCONTINUED] fluticasone propion-salmeteroL (Advair Diskus) 250-50 mcg/dose diskus inhaler Inhale 1 puff 2 times a day. 180 each 0    [DISCONTINUED] insulin glargine (Lantus Solostar U-100 Insulin) 100 unit/mL (3 mL) pen Inject 10 Units under the skin once daily at bedtime. Take as directed per insulin instructions. 5 each 0    [DISCONTINUED] pen needle, diabetic 31 gauge x 5/16\" needle Use to inject 1-4 times daily as directed. 100 each 11    [DISCONTINUED] semaglutide (Ozempic) 2 mg/dose (8 mg/3 mL) pen injector Inject 2 mg under the skin 1 (one) time per week. 9 mL 0     No current facility-administered medications on file prior to visit.        Assessment/Plan   Problem List Items Addressed This Visit       Type 2 diabetes mellitus with stage 4 chronic kidney disease and hypertension (CMS/[facility]) - Primary     Patient's A1c is significantly improved, with last reading of 7.7% (goal <7.5%). Patient is doing well on current therapy. Her A1c is continuing to improve with each screening. Patient would like to stay on current therapy. In January, we will re-assess to assure A1c is still decreasing.   Patient was approved for  Patient Assistance for the upcoming year. She does express concern over Ozempic 2 mg backorder.  [facility] " does have some in stock. Patient is agreeable to picking it up this month in order to not miss a dose. We reviewed her other lab results as well.     Plan:   CONTINUE all current medications as prescribed.     Pharmacy Follow Up: 1/29 @ 1PM. Patient has my contact information in the event that sugars begin to worsen or other problems present prior to next visit.             Jacque Garcia, PharmD    Continue all meds under the continuation of care with the referring provider and clinical pharmacy team.

## 2023-11-06 NOTE — ASSESSMENT & PLAN NOTE
Patient's A1c is significantly improved, with last reading of 7.7% (goal <7.5%). Patient is doing well on current therapy. Her A1c is continuing to improve with each screening. Patient would like to stay on current therapy. In January, we will re-assess to assure A1c is still decreasing.   Patient was approved for  Patient Assistance for the upcoming year. She does express concern over Ozempic 2 mg backorder.  [facility] does have some in stock. Patient is agreeable to picking it up this month in order to not miss a dose. We reviewed her other lab results as well.     Plan:   CONTINUE all current medications as prescribed.     Pharmacy Follow Up: 1/29 @ 1PM. Patient has my contact information in the event that sugars begin to worsen or other problems present prior to next visit.

## 2023-11-08 ENCOUNTER — PATIENT OUTREACH (OUTPATIENT)
Dept: PRIMARY CARE | Facility: CLINIC | Age: 73
End: 2023-11-08
Payer: MEDICARE

## 2023-11-08 NOTE — PROGRESS NOTES
Call regarding appt. with Jing Boykin on 10/30/2023 after hospitalization. At time of outreach call, the patient states she is doing well. No further episodes of diarrhea and her blood sugars have been stable. With pharmacy assistance, she was able to get her ozempic Rx filled.

## 2023-11-28 ENCOUNTER — PHARMACY VISIT (OUTPATIENT)
Dept: PHARMACY | Facility: CLINIC | Age: 73
End: 2023-11-28
Payer: COMMERCIAL

## 2023-11-28 PROCEDURE — RXMED WILLOW AMBULATORY MEDICATION CHARGE

## 2023-11-29 ENCOUNTER — OFFICE VISIT (OUTPATIENT)
Dept: PRIMARY CARE | Facility: CLINIC | Age: 73
End: 2023-11-29
Payer: MEDICARE

## 2023-11-29 VITALS
BODY MASS INDEX: 43.99 KG/M2 | WEIGHT: 264 LBS | DIASTOLIC BLOOD PRESSURE: 84 MMHG | HEIGHT: 65 IN | SYSTOLIC BLOOD PRESSURE: 132 MMHG

## 2023-11-29 DIAGNOSIS — I10 ESSENTIAL HYPERTENSION: ICD-10-CM

## 2023-11-29 DIAGNOSIS — J45.40 MODERATE PERSISTENT ASTHMA WITHOUT COMPLICATION (HHS-HCC): ICD-10-CM

## 2023-11-29 DIAGNOSIS — E78.00 HYPERCHOLESTEROLEMIA: ICD-10-CM

## 2023-11-29 DIAGNOSIS — N18.4 TYPE 2 DIABETES MELLITUS WITH STAGE 4 CHRONIC KIDNEY DISEASE AND HYPERTENSION (MULTI): Primary | ICD-10-CM

## 2023-11-29 DIAGNOSIS — I12.9 TYPE 2 DIABETES MELLITUS WITH STAGE 4 CHRONIC KIDNEY DISEASE AND HYPERTENSION (MULTI): Primary | ICD-10-CM

## 2023-11-29 DIAGNOSIS — E11.22 TYPE 2 DIABETES MELLITUS WITH STAGE 4 CHRONIC KIDNEY DISEASE AND HYPERTENSION (MULTI): Primary | ICD-10-CM

## 2023-11-29 DIAGNOSIS — N18.4 CKD (CHRONIC KIDNEY DISEASE) STAGE 4, GFR 15-29 ML/MIN (MULTI): ICD-10-CM

## 2023-11-29 DIAGNOSIS — E66.01 CLASS 3 SEVERE OBESITY DUE TO EXCESS CALORIES WITH SERIOUS COMORBIDITY AND BODY MASS INDEX (BMI) OF 40.0 TO 44.9 IN ADULT (MULTI): ICD-10-CM

## 2023-11-29 PROCEDURE — 1159F MED LIST DOCD IN RCRD: CPT

## 2023-11-29 PROCEDURE — 3049F LDL-C 100-129 MG/DL: CPT

## 2023-11-29 PROCEDURE — 99214 OFFICE O/P EST MOD 30 MIN: CPT

## 2023-11-29 PROCEDURE — 3079F DIAST BP 80-89 MM HG: CPT

## 2023-11-29 PROCEDURE — 3051F HG A1C>EQUAL 7.0%<8.0%: CPT

## 2023-11-29 PROCEDURE — 3061F NEG MICROALBUMINURIA REV: CPT

## 2023-11-29 PROCEDURE — 3075F SYST BP GE 130 - 139MM HG: CPT

## 2023-11-29 PROCEDURE — 1160F RVW MEDS BY RX/DR IN RCRD: CPT

## 2023-11-29 PROCEDURE — 4010F ACE/ARB THERAPY RXD/TAKEN: CPT

## 2023-11-29 PROCEDURE — 3008F BODY MASS INDEX DOCD: CPT

## 2023-11-29 PROCEDURE — 1036F TOBACCO NON-USER: CPT

## 2023-11-29 RX ORDER — ASPIRIN 81 MG/1
81 TABLET ORAL DAILY
Qty: 90 TABLET | Refills: 1 | Status: SHIPPED | OUTPATIENT
Start: 2023-11-29

## 2023-11-29 RX ORDER — METOPROLOL SUCCINATE 50 MG/1
50 TABLET, EXTENDED RELEASE ORAL DAILY
Qty: 90 TABLET | Refills: 1 | Status: SHIPPED | OUTPATIENT
Start: 2023-11-29 | End: 2024-03-13 | Stop reason: SDUPTHER

## 2023-11-29 RX ORDER — LOSARTAN POTASSIUM 50 MG/1
50 TABLET ORAL DAILY
Qty: 90 TABLET | Refills: 1 | Status: SHIPPED | OUTPATIENT
Start: 2023-11-29 | End: 2024-03-13 | Stop reason: SDUPTHER

## 2023-11-29 RX ORDER — ATORVASTATIN CALCIUM 80 MG/1
80 TABLET, FILM COATED ORAL DAILY
Qty: 90 TABLET | Refills: 1 | Status: SHIPPED | OUTPATIENT
Start: 2023-11-29 | End: 2024-03-13 | Stop reason: SDUPTHER

## 2023-11-29 RX ORDER — GLIPIZIDE 10 MG/1
10 TABLET, FILM COATED, EXTENDED RELEASE ORAL
Qty: 90 TABLET | Refills: 1 | Status: SHIPPED | OUTPATIENT
Start: 2023-11-29 | End: 2024-06-05 | Stop reason: SDUPTHER

## 2023-11-29 ASSESSMENT — PATIENT HEALTH QUESTIONNAIRE - PHQ9
SUM OF ALL RESPONSES TO PHQ9 QUESTIONS 1 AND 2: 0
1. LITTLE INTEREST OR PLEASURE IN DOING THINGS: NOT AT ALL
2. FEELING DOWN, DEPRESSED OR HOPELESS: NOT AT ALL

## 2023-11-29 NOTE — PROGRESS NOTES
"Subjective   Patient ID: Renee Candelario is a 73 y.o. female who presents for Follow-up.  HPI  73 year old female with PMH of T2DM, HTN, HLD, CKD stage 4, presents for hospital follow up.      DM2: glipizide XL 10mg, farxiga 10mg, and ozempic 2mg weekly. Uses CGM. Blood sugars improving. A1C 4/14/23 10.7 --> 7/31/23 8.7 --> 10/30/23 7.7.  Stopped metformin d/t low GFR. Continues to refuse insulin.   HTN: losartan 50mg, metoprolol 50mg  HLD: Atorvastatin 40mg daily  CKD: stage four, GFR 27, needs kidney ultrasound, hasn't scheduled yet.  Asthma: Advair diskus BID, albuterol PRN.     CGM reviewed. Avg blood sugar in 170s. Has lost 23lb since starting ozempic.     Declines podiatry referral. Due for eye exam.     All systems have been reviewed and are negative for complaint other than those mentioned in the HPI.     Objective   /84 (BP Location: Left arm, Patient Position: Sitting, BP Cuff Size: Large adult)   Ht 1.651 m (5' 5\")   Wt 120 kg (264 lb)   BMI 43.93 kg/m²    Physical Exam  Constitutional:       General: She is awake.      Appearance: Normal appearance.   HENT:      Head: Normocephalic and atraumatic.   Eyes:      Extraocular Movements: Extraocular movements intact.      Pupils: Pupils are equal, round, and reactive to light.   Cardiovascular:      Rate and Rhythm: Normal rate and regular rhythm.      Heart sounds: S1 normal and S2 normal. No murmur heard.  Pulmonary:      Effort: Pulmonary effort is normal.      Breath sounds: Normal breath sounds.   Musculoskeletal:      Cervical back: Normal range of motion and neck supple.      Right lower leg: No edema.      Left lower leg: No edema.   Skin:     General: Skin is warm and dry.   Neurological:      General: No focal deficit present.      Mental Status: She is alert and oriented to person, place, and time.   Psychiatric:         Mood and Affect: Mood and affect normal.         Behavior: Behavior normal. Behavior is cooperative.         Thought " Content: Thought content normal.         Judgment: Judgment normal.     Renee was seen today for follow-up.  Diagnoses and all orders for this visit:  Type 2 diabetes mellitus with stage 4 chronic kidney disease and hypertension (CMS/[facility]) (Primary)  -     Improving, A1C down from 10.7 to 7.7.   - Watching diet, avoiding candy and regular soda, continues to drink juice every other day. Encouraged to stop juice consumption.   - Now taking glipizide XL, ozempic 2mg weekly, farxiga 10mg  - Continues to refuse insulin, which I believe would bring patient's A1C to goal. Will revisit next visit  - No hypoglycemic events.   -  In the meantime, will continue current medication and work on diet and lifestyle modification.   - Referral to Ophthalmology; Future  -     aspirin 81 mg EC tablet; Take 1 tablet (81 mg) by mouth once daily.  -     glipiZIDE XL (Glucotrol XL) 10 mg 24 hr tablet; Take 1 tablet (10 mg) by mouth once daily with a meal.  Class 3 severe obesity due to excess calories with serious comorbidity and body mass index (BMI) of 40.0 to 44.9 in adult (CMS/[facility])   - Losing weight slowly but steadily on ozempic   - continue current medication   - Discussed diet and lifestyle modification.   Essential hypertension  -    Stable, at goal, continue current medication.   -  losartan (Cozaar) 50 mg tablet; Take 1 tablet (50 mg) by mouth once daily.  -     metoprolol succinate XL (Toprol-XL) 50 mg 24 hr tablet; Take 1 tablet (50 mg) by mouth once daily.  Hypercholesterolemia  -    Stable, at goal, continue current medication.   -     atorvastatin (Lipitor) 80 mg tablet; Take 1 tablet (80 mg) by mouth once daily.  Moderate persistent asthma without complication  -    Stable, at goal, continue current medication.   Stage 4 CKD   - Kidney ultrasound and nephrology referral were placed at previous appointment, patient reminded to schedule.   Follow up in 3 months.

## 2023-11-29 NOTE — PATIENT INSTRUCTIONS
Please schedule your kidney ultrasound as well as the nephrologist (kidney doctor).     The number to schedule a kidney ultrasound is [phone].   The number to schedule with the kidney doctor (nephrology) is [phone].

## 2023-12-07 PROCEDURE — RXMED WILLOW AMBULATORY MEDICATION CHARGE

## 2023-12-11 ENCOUNTER — PHARMACY VISIT (OUTPATIENT)
Dept: PHARMACY | Facility: CLINIC | Age: 73
End: 2023-12-11
Payer: COMMERCIAL

## 2023-12-23 PROCEDURE — RXMED WILLOW AMBULATORY MEDICATION CHARGE

## 2023-12-27 ENCOUNTER — PHARMACY VISIT (OUTPATIENT)
Dept: PHARMACY | Facility: CLINIC | Age: 73
End: 2023-12-27
Payer: COMMERCIAL

## 2023-12-27 ENCOUNTER — SPECIALTY PHARMACY (OUTPATIENT)
Dept: PHARMACY | Facility: CLINIC | Age: 73
End: 2023-12-27

## 2024-01-08 DIAGNOSIS — N18.4 TYPE 2 DIABETES MELLITUS WITH STAGE 4 CHRONIC KIDNEY DISEASE AND HYPERTENSION (MULTI): ICD-10-CM

## 2024-01-08 DIAGNOSIS — E11.22 TYPE 2 DIABETES MELLITUS WITH STAGE 4 CHRONIC KIDNEY DISEASE AND HYPERTENSION (MULTI): ICD-10-CM

## 2024-01-08 DIAGNOSIS — I12.9 TYPE 2 DIABETES MELLITUS WITH STAGE 4 CHRONIC KIDNEY DISEASE AND HYPERTENSION (MULTI): ICD-10-CM

## 2024-01-08 PROCEDURE — RXMED WILLOW AMBULATORY MEDICATION CHARGE

## 2024-01-08 RX ORDER — SEMAGLUTIDE 2.68 MG/ML
2 INJECTION, SOLUTION SUBCUTANEOUS
Qty: 3 ML | Refills: 5 | Status: SHIPPED | OUTPATIENT
Start: 2024-01-08 | End: 2024-03-28 | Stop reason: ALTCHOICE

## 2024-01-08 NOTE — TELEPHONE ENCOUNTER
Patient left voicemail requesting refill on her Ozempic pen.   Will send refills to pharmacy and set as home delivery per patient request.       Jacque Garcia, OsirisD

## 2024-01-10 ENCOUNTER — PHARMACY VISIT (OUTPATIENT)
Dept: PHARMACY | Facility: CLINIC | Age: 74
End: 2024-01-10
Payer: COMMERCIAL

## 2024-01-18 ENCOUNTER — PATIENT OUTREACH (OUTPATIENT)
Dept: PRIMARY CARE | Facility: CLINIC | Age: 74
End: 2024-01-18
Payer: MEDICARE

## 2024-01-18 NOTE — PROGRESS NOTES
Outreach made to wrap up Transitional Care Management (TCM) program. At the time of call, the patient states she is doing very well. Blood sugars have been stable. No questions or concerns for primary care provider. The patient has met target of no readmission for (90) days post hospital discharge and is graduated from the TCM program at this time.

## 2024-01-22 ENCOUNTER — HOSPITAL ENCOUNTER (OUTPATIENT)
Dept: RADIOLOGY | Facility: HOSPITAL | Age: 74
Discharge: HOME | End: 2024-01-22
Payer: MEDICARE

## 2024-01-22 DIAGNOSIS — N18.4 CKD (CHRONIC KIDNEY DISEASE) STAGE 4, GFR 15-29 ML/MIN (MULTI): ICD-10-CM

## 2024-01-22 PROCEDURE — RXMED WILLOW AMBULATORY MEDICATION CHARGE

## 2024-01-22 PROCEDURE — 76770 US EXAM ABDO BACK WALL COMP: CPT

## 2024-01-23 ENCOUNTER — PHARMACY VISIT (OUTPATIENT)
Dept: PHARMACY | Facility: CLINIC | Age: 74
End: 2024-01-23
Payer: COMMERCIAL

## 2024-01-29 ENCOUNTER — TELEMEDICINE (OUTPATIENT)
Dept: PHARMACY | Facility: HOSPITAL | Age: 74
End: 2024-01-29
Payer: MEDICARE

## 2024-01-29 DIAGNOSIS — E11.22 TYPE 2 DIABETES MELLITUS WITH STAGE 4 CHRONIC KIDNEY DISEASE AND HYPERTENSION (MULTI): Primary | ICD-10-CM

## 2024-01-29 DIAGNOSIS — I12.9 TYPE 2 DIABETES MELLITUS WITH STAGE 4 CHRONIC KIDNEY DISEASE AND HYPERTENSION (MULTI): Primary | ICD-10-CM

## 2024-01-29 DIAGNOSIS — N18.4 TYPE 2 DIABETES MELLITUS WITH STAGE 4 CHRONIC KIDNEY DISEASE AND HYPERTENSION (MULTI): Primary | ICD-10-CM

## 2024-01-29 NOTE — ASSESSMENT & PLAN NOTE
Patient's A1c is significantly improved, with last reading of 7.7% (goal <7.5%). Patient is doing well on current therapy. Her A1c is continuing to improve with each screening. Patient would like to stay on current therapy. Her sugars seem to be doing well, though she does not have any exact readings for today's visit.   Patient most likely due for new A1c at next PCP appointment in March. If still elevated, can consider increase in glipizide to 10 mg BID to maximize that dose prior to starting a new agent.     Plan:   CONTINUE all current medications as prescribed.     Pharmacy Follow Up: 3/18 @ 1PM following PCP visit.   Patient has my contact information in the event that sugars begin to worsen or other problems present prior to next visit.

## 2024-01-29 NOTE — PROGRESS NOTES
Subjective   Patient ID: Renee Candelario is a 73 y.o. female who presents for Diabetes    Referring Provider: MADELIN Najera     Diabetes  She presents for her follow-up diabetic visit. She has type 2 diabetes mellitus. Her disease course has been improving. Compliance with diabetes treatment: Farxiga 10 mg, glipizide 10 mg daily, Ozempic 2 mg weekly.     Describes them as up and down. Does not have exact numbers for me today but says that they are overall doing good.     Objective     There were no vitals taken for this visit.     Labs  Lab Results   Component Value Date    BILITOT 0.3 10/30/2023    CALCIUM 9.5 10/30/2023    CO2 22 10/30/2023     10/30/2023    CREATININE 1.99 (H) 10/30/2023    GLUCOSE 185 (H) 10/30/2023    ALKPHOS 105 10/30/2023    K 4.3 10/30/2023    PROT 7.3 10/30/2023     10/30/2023    AST 11 10/30/2023    ALT 14 10/30/2023    BUN 27 (H) 10/30/2023    ANIONGAP 18 10/30/2023    PHOS 4.7 07/31/2023    ALBUMIN 4.1 10/30/2023    LIPASE 36 07/01/2019    GFRF 36 (A) 07/31/2023     Lab Results   Component Value Date    TRIG 171 (H) 10/30/2023    CHOL 188 10/30/2023    LDLCALC 110 (H) 10/30/2023    HDL 43.7 10/30/2023     Lab Results   Component Value Date    HGBA1C 7.7 (H) 10/30/2023       Current Outpatient Medications on File Prior to Visit   Medication Sig Dispense Refill    albuterol 90 mcg/actuation inhaler Inhale 2 puffs every 6 hours if needed for wheezing or shortness of breath.      aspirin 81 mg EC tablet Take 1 tablet (81 mg) by mouth once daily. 90 tablet 1    atorvastatin (Lipitor) 80 mg tablet Take 1 tablet (80 mg) by mouth once daily. 90 tablet 1    dapagliflozin propanediol (Farxiga) 10 mg Take 1 tablet (10 mg) by mouth once daily. 30 tablet 11    fluticasone propion-salmeteroL (Advair Diskus) 250-50 mcg/dose diskus inhaler Inhale 1 puff 2 times a day. 60 each 11    FreeStyle Scarlet 2 [place] misc USE TWICE DAILY      FreeStyle Scarlet sensor system (FreeStyle Scarlet 2  Sensor) kit Use as instructed. Replace sensor every 14 days 2 each 11    glipiZIDE XL (Glucotrol XL) 10 mg 24 hr tablet Take 1 tablet (10 mg) by mouth once daily with a meal. 90 tablet 1    losartan (Cozaar) 50 mg tablet Take 1 tablet (50 mg) by mouth once daily. 90 tablet 1    metoprolol succinate XL (Toprol-XL) 50 mg 24 hr tablet Take 1 tablet (50 mg) by mouth once daily. 90 tablet 1    multivit-min/ferrous fumarate (MULTI VITAMIN ORAL) Take 1 tablet by mouth once daily.      semaglutide (Ozempic) 2 mg/dose (8 mg/3 mL) pen injector Inject 2 mg under the skin 1 (one) time per week. 3 mL 5     No current facility-administered medications on file prior to visit.        Assessment/Plan   Problem List Items Addressed This Visit       Type 2 diabetes mellitus with stage 4 chronic kidney disease and hypertension (CMS/[facility]) - Primary     Patient's A1c is significantly improved, with last reading of 7.7% (goal <7.5%). Patient is doing well on current therapy. Her A1c is continuing to improve with each screening. Patient would like to stay on current therapy. Her sugars seem to be doing well, though she does not have any exact readings for today's visit.   Patient most likely due for new A1c at next PCP appointment in March. If still elevated, can consider increase in glipizide to 10 mg BID to maximize that dose prior to starting a new agent.     Plan:   CONTINUE all current medications as prescribed.     Pharmacy Follow Up: 3/18 @ 1PM following PCP visit.   Patient has my contact information in the event that sugars begin to worsen or other problems present prior to next visit.           PCP Follow Up: 3/13/24    Jacque Garcia PharmD    Continue all meds under the continuation of care with the referring provider and clinical pharmacy team.

## 2024-02-21 PROCEDURE — RXMED WILLOW AMBULATORY MEDICATION CHARGE

## 2024-02-22 ENCOUNTER — PHARMACY VISIT (OUTPATIENT)
Dept: PHARMACY | Facility: CLINIC | Age: 74
End: 2024-02-22
Payer: COMMERCIAL

## 2024-03-13 ENCOUNTER — OFFICE VISIT (OUTPATIENT)
Dept: PRIMARY CARE | Facility: CLINIC | Age: 74
End: 2024-03-13
Payer: MEDICARE

## 2024-03-13 VITALS
WEIGHT: 259 LBS | SYSTOLIC BLOOD PRESSURE: 110 MMHG | BODY MASS INDEX: 43.15 KG/M2 | DIASTOLIC BLOOD PRESSURE: 70 MMHG | HEIGHT: 65 IN

## 2024-03-13 DIAGNOSIS — Z12.11 COLON CANCER SCREENING: ICD-10-CM

## 2024-03-13 DIAGNOSIS — N18.4 TYPE 2 DIABETES MELLITUS WITH STAGE 4 CHRONIC KIDNEY DISEASE AND HYPERTENSION (MULTI): ICD-10-CM

## 2024-03-13 DIAGNOSIS — E66.01 CLASS 3 SEVERE OBESITY DUE TO EXCESS CALORIES WITH SERIOUS COMORBIDITY AND BODY MASS INDEX (BMI) OF 40.0 TO 44.9 IN ADULT (MULTI): ICD-10-CM

## 2024-03-13 DIAGNOSIS — Z00.00 MEDICARE ANNUAL WELLNESS VISIT, SUBSEQUENT: Primary | ICD-10-CM

## 2024-03-13 DIAGNOSIS — I12.9 TYPE 2 DIABETES MELLITUS WITH STAGE 4 CHRONIC KIDNEY DISEASE AND HYPERTENSION (MULTI): ICD-10-CM

## 2024-03-13 DIAGNOSIS — E11.22 TYPE 2 DIABETES MELLITUS WITH STAGE 4 CHRONIC KIDNEY DISEASE AND HYPERTENSION (MULTI): ICD-10-CM

## 2024-03-13 DIAGNOSIS — J45.40 MODERATE PERSISTENT ASTHMA WITHOUT COMPLICATION (HHS-HCC): ICD-10-CM

## 2024-03-13 DIAGNOSIS — E78.00 HYPERCHOLESTEROLEMIA: ICD-10-CM

## 2024-03-13 DIAGNOSIS — N18.4 CKD (CHRONIC KIDNEY DISEASE) STAGE 4, GFR 15-29 ML/MIN (MULTI): ICD-10-CM

## 2024-03-13 DIAGNOSIS — I10 ESSENTIAL HYPERTENSION: ICD-10-CM

## 2024-03-13 DIAGNOSIS — Z13.31 DEPRESSION SCREENING: ICD-10-CM

## 2024-03-13 LAB — POC HEMOGLOBIN A1C: 7.5 % (ref 4.2–6.5)

## 2024-03-13 PROCEDURE — 4010F ACE/ARB THERAPY RXD/TAKEN: CPT

## 2024-03-13 PROCEDURE — 1170F FXNL STATUS ASSESSED: CPT

## 2024-03-13 PROCEDURE — 1036F TOBACCO NON-USER: CPT

## 2024-03-13 PROCEDURE — 1123F ACP DISCUSS/DSCN MKR DOCD: CPT

## 2024-03-13 PROCEDURE — 99214 OFFICE O/P EST MOD 30 MIN: CPT

## 2024-03-13 PROCEDURE — G0444 DEPRESSION SCREEN ANNUAL: HCPCS

## 2024-03-13 PROCEDURE — G0439 PPPS, SUBSEQ VISIT: HCPCS

## 2024-03-13 PROCEDURE — 1158F ADVNC CARE PLAN TLK DOCD: CPT

## 2024-03-13 PROCEDURE — 1160F RVW MEDS BY RX/DR IN RCRD: CPT

## 2024-03-13 PROCEDURE — 83036 HEMOGLOBIN GLYCOSYLATED A1C: CPT

## 2024-03-13 PROCEDURE — 3078F DIAST BP <80 MM HG: CPT

## 2024-03-13 PROCEDURE — 3008F BODY MASS INDEX DOCD: CPT

## 2024-03-13 PROCEDURE — 3074F SYST BP LT 130 MM HG: CPT

## 2024-03-13 PROCEDURE — 1159F MED LIST DOCD IN RCRD: CPT

## 2024-03-13 RX ORDER — FLUTICASONE PROPIONATE AND SALMETEROL 250; 50 UG/1; UG/1
1 POWDER RESPIRATORY (INHALATION)
Qty: 60 EACH | Refills: 11 | Status: SHIPPED | OUTPATIENT
Start: 2024-03-13

## 2024-03-13 RX ORDER — LOSARTAN POTASSIUM 50 MG/1
50 TABLET ORAL DAILY
Qty: 90 TABLET | Refills: 1 | Status: SHIPPED | OUTPATIENT
Start: 2024-03-13 | End: 2024-04-19 | Stop reason: SDUPTHER

## 2024-03-13 RX ORDER — METOPROLOL SUCCINATE 50 MG/1
50 TABLET, EXTENDED RELEASE ORAL DAILY
Qty: 90 TABLET | Refills: 1 | Status: SHIPPED | OUTPATIENT
Start: 2024-03-13 | End: 2024-06-05 | Stop reason: SDUPTHER

## 2024-03-13 RX ORDER — ATORVASTATIN CALCIUM 80 MG/1
80 TABLET, FILM COATED ORAL DAILY
Qty: 90 TABLET | Refills: 1 | Status: SHIPPED | OUTPATIENT
Start: 2024-03-13 | End: 2024-06-05 | Stop reason: SDUPTHER

## 2024-03-13 RX ORDER — ALBUTEROL SULFATE 90 UG/1
2 AEROSOL, METERED RESPIRATORY (INHALATION) EVERY 6 HOURS PRN
Qty: 18 G | Refills: 2 | Status: SHIPPED | OUTPATIENT
Start: 2024-03-13

## 2024-03-13 ASSESSMENT — PATIENT HEALTH QUESTIONNAIRE - PHQ9
1. LITTLE INTEREST OR PLEASURE IN DOING THINGS: NOT AT ALL
1. LITTLE INTEREST OR PLEASURE IN DOING THINGS: NOT AT ALL
SUM OF ALL RESPONSES TO PHQ9 QUESTIONS 1 AND 2: 0
2. FEELING DOWN, DEPRESSED OR HOPELESS: NOT AT ALL
2. FEELING DOWN, DEPRESSED OR HOPELESS: NOT AT ALL
SUM OF ALL RESPONSES TO PHQ9 QUESTIONS 1 AND 2: 0

## 2024-03-13 ASSESSMENT — ACTIVITIES OF DAILY LIVING (ADL)
GROCERY_SHOPPING: INDEPENDENT
BATHING: INDEPENDENT
MANAGING_FINANCES: INDEPENDENT
DRESSING: INDEPENDENT
DOING_HOUSEWORK: INDEPENDENT
TAKING_MEDICATION: INDEPENDENT

## 2024-03-13 ASSESSMENT — LIFESTYLE VARIABLES: HOW MANY STANDARD DRINKS CONTAINING ALCOHOL DO YOU HAVE ON A TYPICAL DAY: PATIENT DOES NOT DRINK

## 2024-03-13 NOTE — PROGRESS NOTES
Subjective   Patient ID: Renee Candelario is a 73 y.o. female who presents for W.  HPI  73 year old female with PMH of T2DM, HTN, HLD, CKD stage 4, presents for follow up.      DM2: glipizide XL 10mg, farxiga 10mg, and ozempic 2mg weekly. Uses CGM. Blood sugars improving. A1C 4/14/23 10.7 --> 7/31/23 8.7 --> 10/30/23 7.7. --> 3/13/24 A1C 7.5.  Stopped metformin d/t low GFR. Continues to refuse insulin.   HTN: losartan 50mg, metoprolol 50mg  HLD: Atorvastatin 80mg daily  CKD: stage four, GFR 27, needs kidney ultrasound, hasn't scheduled yet.  Asthma: Advair diskus BID, albuterol PRN. Usually needing inhaler <2x weekly.     CGM data reviewed, 63% data captured, averages 3 scans per day. Average blood glucose 135.   Lost 30lbs since starting ozempic 1 year ago.     The patient is being seen for the subsequent annual wellness visit and follow up.  Past Medical, Surgical and Family History: reviewed and updated in chart.   Interval History: Patient has not been hospitalized previously.   Medications and Supplements: Review of all medications by a prescribing practitioner or clinical pharmacist (such as prescriptions, OTCs, herbal therapies and supplements) documented in the medical record.    No, the patient is not using opioids.   Health Risk Assessment:. Paper HRA completed by patient and scanned into chart.   Depression/Suicide Screening:  .   Done  No falls in the past year.  No recent hospitalizations.  Advance care planning completed.    Medicare Wellness Billing Compliance Satisfied    *This is a visual tool to show completion of required items on the day of the visit. Green checks will only appear on the date of visit.    Review all medications by prescribing practitioner or clinical pharmacist (such as prescriptions, OTCs, herbal therapies and supplements) documented in the medical record    Past Medical, Surgical, and Family History reviewed and updated in chart    Tobacco Use Reviewed    Alcohol Use  "Reviewed    Illicit Drug Use Reviewed    PHQ2/9    Falls in Last Year Reviewed    Home Safety Risk Factors Reviewed    Cognitive Impairment Reviewed    Patient Self Assessment and Health Status    Current Diet Reviewed    Exercise Frequency    ADL - Hearing Impairment    ADL - Bathing    ADL - Dressing    ADL - Walks in Home    IADL - Managing Finances    IADL - Grocery Shopping    IADL - Taking Medications    IADL - Doing Housework      All systems have been reviewed and are negative for complaint other than those mentioned in the HPI.     Objective   /70 (BP Location: Right arm, Patient Position: Sitting, BP Cuff Size: Large adult long)   Ht 1.651 m (5' 5\")   Wt 117 kg (259 lb)   BMI 43.10 kg/m²    Physical Exam  Constitutional:       General: She is awake.      Appearance: Normal appearance.   HENT:      Head: Normocephalic and atraumatic.   Eyes:      Extraocular Movements: Extraocular movements intact.      Pupils: Pupils are equal, round, and reactive to light.   Cardiovascular:      Rate and Rhythm: Normal rate and regular rhythm.      Heart sounds: S1 normal and S2 normal. No murmur heard.  Pulmonary:      Effort: Pulmonary effort is normal.      Breath sounds: Normal breath sounds.   Musculoskeletal:      Cervical back: Normal range of motion and neck supple.      Right lower leg: No edema.      Left lower leg: No edema.   Skin:     General: Skin is warm and dry.   Neurological:      General: No focal deficit present.      Mental Status: She is alert and oriented to person, place, and time.   Psychiatric:         Mood and Affect: Mood and affect normal.         Behavior: Behavior normal. Behavior is cooperative.         Thought Content: Thought content normal.         Judgment: Judgment normal.     Renee was seen today for [facility].  Diagnoses and all orders for this visit:  Medicare annual wellness visit, subsequent (Primary)   - UTD screening other than colon cancer   - Attempted " colon cancer screening in the past but had incorrect collection technique   - Reordered  Class 3 severe obesity due to excess calories with serious comorbidity and body mass index (BMI) of 40.0 to 44.9 in adult (CMS/[facility])   - Discussed diet and weightloss with patient. Currently on 2mg of ozempic, contacted pharmacy team who will check coverage to switch to mounjaro for further weight and blood sugar control  Type 2 diabetes mellitus with stage 4 chronic kidney disease and hypertension (CMS/[facility])  -    A1C 7.5. Improving! Will investigate coverage for mounjaro vs ozempic, attempt to switch to mounjaro for further sugar control  - Continue current medication   -  POCT glycosylated hemoglobin (Hb A1C) manually resulted  CKD (chronic kidney disease) stage 4, GFR 15-29 ml/min (CMS/[facility])  -     Stage four.   - Recommend seeing nephrology as had been recommended in previous appointments, referral placed again, patient to schedule   - Comprehensive metabolic panel; Future  -     Referral to Nephrology; Future  -     Phosphorus; Future  -     Urinalysis with Reflex Microscopic; Future  -     Albumin, urine, random; Future  Colon cancer screening  -     Cologuard® colon cancer screening; Future  -     Cologuard® colon cancer screening  Depression screening   - Reviewed PHQ-2, screening negative. Low concern for depression, patient denies depressive symptoms.   Essential hypertension  -     At goal, continue current medication   -metoprolol succinate XL (Toprol-XL) 50 mg 24 hr tablet; Take 1 tablet (50 mg) by mouth once daily.  -     losartan (Cozaar) 50 mg tablet; Take 1 tablet (50 mg) by mouth once daily.  Hypercholesterolemia  -     At goal, continue current medication   - atorvastatin (Lipitor) 80 mg tablet; Take 1 tablet (80 mg) by mouth once daily.  Moderate persistent asthma without complication  -    At goal, continue current medication   -  fluticasone propion-salmeteroL (Advair Diskus) 250-50 mcg/dose diskus inhaler;  Inhale 1 puff 2 times a day.  -     albuterol 90 mcg/actuation inhaler; Inhale 2 puffs every 6 hours if needed for wheezing or shortness of breath.    Follow up in 3 months.

## 2024-03-18 ENCOUNTER — TELEMEDICINE (OUTPATIENT)
Dept: PHARMACY | Facility: HOSPITAL | Age: 74
End: 2024-03-18
Payer: MEDICARE

## 2024-03-18 DIAGNOSIS — I12.9 TYPE 2 DIABETES MELLITUS WITH STAGE 4 CHRONIC KIDNEY DISEASE AND HYPERTENSION (MULTI): Primary | ICD-10-CM

## 2024-03-18 DIAGNOSIS — N18.4 TYPE 2 DIABETES MELLITUS WITH STAGE 4 CHRONIC KIDNEY DISEASE AND HYPERTENSION (MULTI): Primary | ICD-10-CM

## 2024-03-18 DIAGNOSIS — E11.22 TYPE 2 DIABETES MELLITUS WITH STAGE 4 CHRONIC KIDNEY DISEASE AND HYPERTENSION (MULTI): Primary | ICD-10-CM

## 2024-03-18 NOTE — ASSESSMENT & PLAN NOTE
Patient's A1c is significantly improved, with last reading of 7.5% (goal <7-7.5%). Patient is doing well on current therapy. PCP and patient are interested in switching to Mounjaro in order for improved glycemic control and improved weight loss. Briefly discuss Mounjaro today with patient and she is agreeable to a PA being submitted.     Will submit PA today for Mounjaro coverage. If approved, it can be added to patient's PAP profile.     Plan:   CONTINUE all current medications as prescribed.

## 2024-03-18 NOTE — PROGRESS NOTES
Subjective   Patient ID: Renee Candelario is a 73 y.o. female who presents for Diabetes    Referring Provider: AMDELIN Najera     Diabetes  She presents for her follow-up diabetic visit. She has type 2 diabetes mellitus. Her disease course has been improving. Compliance with diabetes treatment: Farxiga 10 mg, glipizide 10 mg daily, Ozempic 2 mg weekly.     Describes them as up and down. Does not have exact numbers for me today but says that they are overall doing good.     Objective     There were no vitals taken for this visit.     Labs  Lab Results   Component Value Date    BILITOT 0.3 10/30/2023    CALCIUM 9.5 10/30/2023    CO2 22 10/30/2023     10/30/2023    CREATININE 1.99 (H) 10/30/2023    GLUCOSE 185 (H) 10/30/2023    ALKPHOS 105 10/30/2023    K 4.3 10/30/2023    PROT 7.3 10/30/2023     10/30/2023    AST 11 10/30/2023    ALT 14 10/30/2023    BUN 27 (H) 10/30/2023    ANIONGAP 18 10/30/2023    PHOS 4.7 07/31/2023    ALBUMIN 4.1 10/30/2023    LIPASE 36 07/01/2019    GFRF 36 (A) 07/31/2023     Lab Results   Component Value Date    TRIG 171 (H) 10/30/2023    CHOL 188 10/30/2023    LDLCALC 110 (H) 10/30/2023    HDL 43.7 10/30/2023     Lab Results   Component Value Date    HGBA1C 7.5 (A) 03/13/2024       Current Outpatient Medications on File Prior to Visit   Medication Sig Dispense Refill    albuterol 90 mcg/actuation inhaler Inhale 2 puffs every 6 hours if needed for wheezing or shortness of breath. 18 g 2    aspirin 81 mg EC tablet Take 1 tablet (81 mg) by mouth once daily. 90 tablet 1    atorvastatin (Lipitor) 80 mg tablet Take 1 tablet (80 mg) by mouth once daily. 90 tablet 1    dapagliflozin propanediol (Farxiga) 10 mg Take 1 tablet (10 mg) by mouth once daily. 30 tablet 11    fluticasone propion-salmeteroL (Advair Diskus) 250-50 mcg/dose diskus inhaler Inhale 1 puff 2 times a day. 60 each 11    FreeStyle Scarlet 2 [place] misc USE TWICE DAILY      FreeStyle Scarlet sensor system (FreeStyle Scarlet  2 Sensor) kit Use as instructed. Replace sensor every 14 days 2 each 11    glipiZIDE XL (Glucotrol XL) 10 mg 24 hr tablet Take 1 tablet (10 mg) by mouth once daily with a meal. 90 tablet 1    losartan (Cozaar) 50 mg tablet Take 1 tablet (50 mg) by mouth once daily. 90 tablet 1    metoprolol succinate XL (Toprol-XL) 50 mg 24 hr tablet Take 1 tablet (50 mg) by mouth once daily. 90 tablet 1    multivit-min/ferrous fumarate (MULTI VITAMIN ORAL) Take 1 tablet by mouth once daily.      semaglutide (Ozempic) 2 mg/dose (8 mg/3 mL) pen injector Inject 2 mg under the skin 1 (one) time per week. 3 mL 5    [DISCONTINUED] albuterol 90 mcg/actuation inhaler Inhale 2 puffs every 6 hours if needed for wheezing or shortness of breath.      [DISCONTINUED] atorvastatin (Lipitor) 80 mg tablet Take 1 tablet (80 mg) by mouth once daily. 90 tablet 1    [DISCONTINUED] fluticasone propion-salmeteroL (Advair Diskus) 250-50 mcg/dose diskus inhaler Inhale 1 puff 2 times a day. 60 each 11    [DISCONTINUED] losartan (Cozaar) 50 mg tablet Take 1 tablet (50 mg) by mouth once daily. 90 tablet 1    [DISCONTINUED] metoprolol succinate XL (Toprol-XL) 50 mg 24 hr tablet Take 1 tablet (50 mg) by mouth once daily. 90 tablet 1     No current facility-administered medications on file prior to visit.        Assessment/Plan   Problem List Items Addressed This Visit       Type 2 diabetes mellitus with stage 4 chronic kidney disease and hypertension (CMS/[facility]) - Primary     Patient's A1c is significantly improved, with last reading of 7.5% (goal <7-7.5%). Patient is doing well on current therapy. PCP and patient are interested in switching to Mounjaro in order for improved glycemic control and improved weight loss. Briefly discuss Mounjaro today with patient and she is agreeable to a PA being submitted.     Will submit PA today for Mounjaro coverage. If approved, it can be added to patient's PAP profile.     Plan:   CONTINUE all current medications as  prescribed.               Pharmacy Follow Up: 4/1 @ 1pm  PCP Follow Up: 6/12/2024    Jacque Garcia, Alvina    Continue all meds under the continuation of care with the referring provider and clinical pharmacy team.

## 2024-03-19 PROCEDURE — RXMED WILLOW AMBULATORY MEDICATION CHARGE

## 2024-03-20 ENCOUNTER — PHARMACY VISIT (OUTPATIENT)
Dept: PHARMACY | Facility: CLINIC | Age: 74
End: 2024-03-20
Payer: COMMERCIAL

## 2024-03-21 ENCOUNTER — LAB (OUTPATIENT)
Dept: LAB | Facility: LAB | Age: 74
End: 2024-03-21
Payer: MEDICARE

## 2024-03-21 DIAGNOSIS — N18.4 CKD (CHRONIC KIDNEY DISEASE) STAGE 4, GFR 15-29 ML/MIN (MULTI): ICD-10-CM

## 2024-03-21 LAB
ALBUMIN SERPL BCP-MCNC: 3.7 G/DL (ref 3.4–5)
ALP SERPL-CCNC: 99 U/L (ref 33–136)
ALT SERPL W P-5'-P-CCNC: 12 U/L (ref 7–45)
ANION GAP SERPL CALC-SCNC: 15 MMOL/L (ref 10–20)
APPEARANCE UR: ABNORMAL
AST SERPL W P-5'-P-CCNC: 12 U/L (ref 9–39)
BACTERIA #/AREA URNS AUTO: ABNORMAL /HPF
BILIRUB SERPL-MCNC: 0.4 MG/DL (ref 0–1.2)
BILIRUB UR STRIP.AUTO-MCNC: NEGATIVE MG/DL
BUN SERPL-MCNC: 26 MG/DL (ref 6–23)
CALCIUM SERPL-MCNC: 9.4 MG/DL (ref 8.6–10.6)
CHLORIDE SERPL-SCNC: 105 MMOL/L (ref 98–107)
CO2 SERPL-SCNC: 24 MMOL/L (ref 21–32)
COLOR UR: ABNORMAL
CREAT SERPL-MCNC: 1.61 MG/DL (ref 0.5–1.05)
CREAT UR-MCNC: 106.8 MG/DL (ref 20–320)
EGFRCR SERPLBLD CKD-EPI 2021: 34 ML/MIN/1.73M*2
GLUCOSE SERPL-MCNC: 126 MG/DL (ref 74–99)
GLUCOSE UR STRIP.AUTO-MCNC: ABNORMAL MG/DL
HYALINE CASTS #/AREA URNS AUTO: ABNORMAL /LPF
KETONES UR STRIP.AUTO-MCNC: NEGATIVE MG/DL
LEUKOCYTE ESTERASE UR QL STRIP.AUTO: ABNORMAL
MICROALBUMIN UR-MCNC: 20.7 MG/L
MICROALBUMIN/CREAT UR: 19.4 UG/MG CREAT
MUCOUS THREADS #/AREA URNS AUTO: ABNORMAL /LPF
NITRITE UR QL STRIP.AUTO: NEGATIVE
PH UR STRIP.AUTO: 5.5 [PH]
PHOSPHATE SERPL-MCNC: 5 MG/DL (ref 2.5–4.9)
POTASSIUM SERPL-SCNC: 4.1 MMOL/L (ref 3.5–5.3)
PROT SERPL-MCNC: 6.3 G/DL (ref 6.4–8.2)
PROT UR STRIP.AUTO-MCNC: NEGATIVE MG/DL
RBC # UR STRIP.AUTO: NEGATIVE /UL
RBC #/AREA URNS AUTO: ABNORMAL /HPF
SODIUM SERPL-SCNC: 140 MMOL/L (ref 136–145)
SP GR UR STRIP.AUTO: 1.01
SQUAMOUS #/AREA URNS AUTO: ABNORMAL /HPF
UROBILINOGEN UR STRIP.AUTO-MCNC: NORMAL MG/DL
WBC #/AREA URNS AUTO: ABNORMAL /HPF
WBC CLUMPS #/AREA URNS AUTO: ABNORMAL /HPF

## 2024-03-21 PROCEDURE — 36415 COLL VENOUS BLD VENIPUNCTURE: CPT

## 2024-03-21 PROCEDURE — 81001 URINALYSIS AUTO W/SCOPE: CPT

## 2024-03-21 PROCEDURE — 80053 COMPREHEN METABOLIC PANEL: CPT

## 2024-03-21 PROCEDURE — 82570 ASSAY OF URINE CREATININE: CPT

## 2024-03-21 PROCEDURE — 82043 UR ALBUMIN QUANTITATIVE: CPT

## 2024-03-21 PROCEDURE — 84100 ASSAY OF PHOSPHORUS: CPT

## 2024-03-28 ENCOUNTER — TELEMEDICINE (OUTPATIENT)
Dept: PHARMACY | Facility: HOSPITAL | Age: 74
End: 2024-03-28
Payer: MEDICARE

## 2024-03-28 DIAGNOSIS — E11.22 TYPE 2 DIABETES MELLITUS WITH STAGE 4 CHRONIC KIDNEY DISEASE AND HYPERTENSION (MULTI): Primary | ICD-10-CM

## 2024-03-28 DIAGNOSIS — I12.9 TYPE 2 DIABETES MELLITUS WITH STAGE 4 CHRONIC KIDNEY DISEASE AND HYPERTENSION (MULTI): Primary | ICD-10-CM

## 2024-03-28 DIAGNOSIS — N18.4 TYPE 2 DIABETES MELLITUS WITH STAGE 4 CHRONIC KIDNEY DISEASE AND HYPERTENSION (MULTI): Primary | ICD-10-CM

## 2024-03-28 RX ORDER — TIRZEPATIDE 5 MG/.5ML
5 INJECTION, SOLUTION SUBCUTANEOUS
Qty: 2 ML | Refills: 0 | Status: SHIPPED | OUTPATIENT
Start: 2024-03-28 | End: 2024-04-01 | Stop reason: SDUPTHER

## 2024-03-28 NOTE — ASSESSMENT & PLAN NOTE
Patient's A1c is significantly improved, with last reading of 7.5% (goal <7%). Patient is still interested in switching to Mounjaro for improved glycemic control and weight loss. Prior authorization was approved for patient. We discuss switching therapies and the different injection techniques involved for Mounjaro. Patient verbalizes understanding.     Plan:   START Mounjaro 5 mg weekly   Counseled patient on MOA, expectations, side effects, duration of therapy, contraindications, administration techniques, and monitoring parameters  Answered all other questions and concerns  Rx sent to [facility] for PAP

## 2024-03-28 NOTE — PROGRESS NOTES
Subjective   Patient ID: Renee Candelario is a 73 y.o. female who presents for Diabetes    Referring Provider: MADELIN Najera     Diabetes  She presents for her follow-up diabetic visit. She has type 2 diabetes mellitus. Her disease course has been improving. Compliance with diabetes treatment: Farxiga 10 mg, glipizide 10 mg daily, Ozempic 2 mg weekly.     Describes them as up and down. Does not have exact numbers for me today but says that they are overall doing good.     Objective     There were no vitals taken for this visit.     Labs  Lab Results   Component Value Date    BILITOT 0.4 03/21/2024    CALCIUM 9.4 03/21/2024    CO2 24 03/21/2024     03/21/2024    CREATININE 1.61 (H) 03/21/2024    GLUCOSE 126 (H) 03/21/2024    ALKPHOS 99 03/21/2024    K 4.1 03/21/2024    PROT 6.3 (L) 03/21/2024     03/21/2024    AST 12 03/21/2024    ALT 12 03/21/2024    BUN 26 (H) 03/21/2024    ANIONGAP 15 03/21/2024    PHOS 5.0 (H) 03/21/2024    ALBUMIN 3.7 03/21/2024    LIPASE 36 07/01/2019    GFRF 36 (A) 07/31/2023     Lab Results   Component Value Date    TRIG 171 (H) 10/30/2023    CHOL 188 10/30/2023    LDLCALC 110 (H) 10/30/2023    HDL 43.7 10/30/2023     Lab Results   Component Value Date    HGBA1C 7.5 (A) 03/13/2024       Current Outpatient Medications on File Prior to Visit   Medication Sig Dispense Refill    albuterol 90 mcg/actuation inhaler Inhale 2 puffs every 6 hours if needed for wheezing or shortness of breath. 18 g 2    aspirin 81 mg EC tablet Take 1 tablet (81 mg) by mouth once daily. 90 tablet 1    atorvastatin (Lipitor) 80 mg tablet Take 1 tablet (80 mg) by mouth once daily. 90 tablet 1    dapagliflozin propanediol (Farxiga) 10 mg Take 1 tablet (10 mg) by mouth once daily. 30 tablet 11    fluticasone propion-salmeteroL (Advair Diskus) 250-50 mcg/dose diskus inhaler Inhale 1 puff 2 times a day. 60 each 11    FreeStyle Scarlet 2 [place] misc USE TWICE DAILY      FreeStyle Scarlet sensor system  (FreeStyle Scarlet 2 Sensor) kit Use as instructed. Replace sensor every 14 days 2 each 11    glipiZIDE XL (Glucotrol XL) 10 mg 24 hr tablet Take 1 tablet (10 mg) by mouth once daily with a meal. 90 tablet 1    losartan (Cozaar) 50 mg tablet Take 1 tablet (50 mg) by mouth once daily. 90 tablet 1    metoprolol succinate XL (Toprol-XL) 50 mg 24 hr tablet Take 1 tablet (50 mg) by mouth once daily. 90 tablet 1    multivit-min/ferrous fumarate (MULTI VITAMIN ORAL) Take 1 tablet by mouth once daily.      [DISCONTINUED] semaglutide (Ozempic) 2 mg/dose (8 mg/3 mL) pen injector Inject 2 mg under the skin 1 (one) time per week. 3 mL 5     No current facility-administered medications on file prior to visit.        Assessment/Plan   Problem List Items Addressed This Visit       Type 2 diabetes mellitus with stage 4 chronic kidney disease and hypertension (CMS/[facility]) - Primary     Patient's A1c is significantly improved, with last reading of 7.5% (goal <7%). Patient is still interested in switching to Mounjaro for improved glycemic control and weight loss. Prior authorization was approved for patient. We discuss switching therapies and the different injection techniques involved for Mounjaro. Patient verbalizes understanding.     Plan:   START Mounjaro 5 mg weekly   Counseled patient on MOA, expectations, side effects, duration of therapy, contraindications, administration techniques, and monitoring parameters  Answered all other questions and concerns  Rx sent to [facility] for PAP           Relevant Medications    tirzepatide (Mounjaro) 5 mg/0.5 mL pen injector     Pharmacy Follow Up: 4/15 @ 12:30 pm  PCP Follow Up: 6/12/2024    Jacque Garcia, PharmD    Continue all meds under the continuation of care with the referring provider and clinical pharmacy team.

## 2024-04-01 ENCOUNTER — APPOINTMENT (OUTPATIENT)
Dept: PHARMACY | Facility: HOSPITAL | Age: 74
End: 2024-04-01
Payer: MEDICARE

## 2024-04-01 DIAGNOSIS — I12.9 TYPE 2 DIABETES MELLITUS WITH STAGE 4 CHRONIC KIDNEY DISEASE AND HYPERTENSION (MULTI): ICD-10-CM

## 2024-04-01 DIAGNOSIS — E11.22 TYPE 2 DIABETES MELLITUS WITH STAGE 4 CHRONIC KIDNEY DISEASE AND HYPERTENSION (MULTI): ICD-10-CM

## 2024-04-01 DIAGNOSIS — N18.4 TYPE 2 DIABETES MELLITUS WITH STAGE 4 CHRONIC KIDNEY DISEASE AND HYPERTENSION (MULTI): ICD-10-CM

## 2024-04-01 PROCEDURE — RXMED WILLOW AMBULATORY MEDICATION CHARGE

## 2024-04-01 RX ORDER — TIRZEPATIDE 5 MG/.5ML
5 INJECTION, SOLUTION SUBCUTANEOUS
Qty: 2 ML | Refills: 2 | Status: SHIPPED | OUTPATIENT
Start: 2024-04-01 | End: 2024-04-15 | Stop reason: SDUPTHER

## 2024-04-02 ENCOUNTER — PHARMACY VISIT (OUTPATIENT)
Dept: PHARMACY | Facility: CLINIC | Age: 74
End: 2024-04-02
Payer: COMMERCIAL

## 2024-04-15 ENCOUNTER — TELEMEDICINE (OUTPATIENT)
Dept: PHARMACY | Facility: HOSPITAL | Age: 74
End: 2024-04-15
Payer: MEDICARE

## 2024-04-15 DIAGNOSIS — E11.22 TYPE 2 DIABETES MELLITUS WITH STAGE 4 CHRONIC KIDNEY DISEASE AND HYPERTENSION (MULTI): ICD-10-CM

## 2024-04-15 DIAGNOSIS — I12.9 TYPE 2 DIABETES MELLITUS WITH STAGE 4 CHRONIC KIDNEY DISEASE AND HYPERTENSION (MULTI): ICD-10-CM

## 2024-04-15 DIAGNOSIS — N18.4 TYPE 2 DIABETES MELLITUS WITH STAGE 4 CHRONIC KIDNEY DISEASE AND HYPERTENSION (MULTI): ICD-10-CM

## 2024-04-15 PROCEDURE — RXMED WILLOW AMBULATORY MEDICATION CHARGE

## 2024-04-15 RX ORDER — TIRZEPATIDE 5 MG/.5ML
5 INJECTION, SOLUTION SUBCUTANEOUS
Qty: 2 ML | Refills: 0 | Status: SHIPPED | OUTPATIENT
Start: 2024-04-15 | End: 2024-05-13 | Stop reason: SDUPTHER

## 2024-04-15 NOTE — ASSESSMENT & PLAN NOTE
Patient's A1c is significantly improved, with last reading of 7.5% (goal <7%). Patient is doing well on the Mounjaro 5 mg after 2 injections. She states that her blood sugars are pretty good, but does not have any readings to share at today's visit. She would like to continue on the 5 mg dose for another month before increasing.     Plan:   CONTINUE Mounjaro 5 mg weekly   Rx sent to [facility] for PAP  CONTINUE Farxiga and glipizide

## 2024-04-15 NOTE — PROGRESS NOTES
Subjective   Patient ID: Renee Candelario is a 74 y.o. female who presents for Diabetes    Referring Provider: MADELIN Najera     Diabetes  She presents for her follow-up diabetic visit. She has type 2 diabetes mellitus. Her disease course has been improving. Compliance with diabetes treatment: Farxiga 10 mg, glipizide 10 mg daily, Mounjaro 5 mg weekly.     Describes them as up and down. Does not have exact numbers for me today but says that they are overall doing good.     Objective     There were no vitals taken for this visit.     Labs  Lab Results   Component Value Date    BILITOT 0.4 03/21/2024    CALCIUM 9.4 03/21/2024    CO2 24 03/21/2024     03/21/2024    CREATININE 1.61 (H) 03/21/2024    GLUCOSE 126 (H) 03/21/2024    ALKPHOS 99 03/21/2024    K 4.1 03/21/2024    PROT 6.3 (L) 03/21/2024     03/21/2024    AST 12 03/21/2024    ALT 12 03/21/2024    BUN 26 (H) 03/21/2024    ANIONGAP 15 03/21/2024    PHOS 5.0 (H) 03/21/2024    ALBUMIN 3.7 03/21/2024    LIPASE 36 07/01/2019    GFRF 36 (A) 07/31/2023     Lab Results   Component Value Date    TRIG 171 (H) 10/30/2023    CHOL 188 10/30/2023    LDLCALC 110 (H) 10/30/2023    HDL 43.7 10/30/2023     Lab Results   Component Value Date    HGBA1C 7.5 (A) 03/13/2024       Current Outpatient Medications on File Prior to Visit   Medication Sig Dispense Refill    albuterol 90 mcg/actuation inhaler Inhale 2 puffs every 6 hours if needed for wheezing or shortness of breath. 18 g 2    aspirin 81 mg EC tablet Take 1 tablet (81 mg) by mouth once daily. 90 tablet 1    atorvastatin (Lipitor) 80 mg tablet Take 1 tablet (80 mg) by mouth once daily. 90 tablet 1    dapagliflozin propanediol (Farxiga) 10 mg Take 1 tablet (10 mg) by mouth once daily. 30 tablet 11    fluticasone propion-salmeteroL (Advair Diskus) 250-50 mcg/dose diskus inhaler Inhale 1 puff 2 times a day. 60 each 11    FreeStyle Scarlet 2 [place] misc USE TWICE DAILY      FreeStyle Scarlet sensor system  (FreeStyle Scarlet 2 Sensor) kit Use as instructed. Replace sensor every 14 days 2 each 11    glipiZIDE XL (Glucotrol XL) 10 mg 24 hr tablet Take 1 tablet (10 mg) by mouth once daily with a meal. 90 tablet 1    losartan (Cozaar) 50 mg tablet Take 1 tablet (50 mg) by mouth once daily. 90 tablet 1    metoprolol succinate XL (Toprol-XL) 50 mg 24 hr tablet Take 1 tablet (50 mg) by mouth once daily. 90 tablet 1    multivit-min/ferrous fumarate (MULTI VITAMIN ORAL) Take 1 tablet by mouth once daily.      [DISCONTINUED] tirzepatide (Mounjaro) 5 mg/0.5 mL pen injector Inject 5 mg under the skin 1 (one) time per week. 2 mL 2     No current facility-administered medications on file prior to visit.        Assessment/Plan   Problem List Items Addressed This Visit       Type 2 diabetes mellitus with stage 4 chronic kidney disease and hypertension (Multi)     Patient's A1c is significantly improved, with last reading of 7.5% (goal <7%). Patient is doing well on the Mounjaro 5 mg after 2 injections. She states that her blood sugars are pretty good, but does not have any readings to share at today's visit. She would like to continue on the 5 mg dose for another month before increasing.     Plan:   CONTINUE Mounjaro 5 mg weekly   Rx sent to [facility] for PAP  CONTINUE Farxiga and glipizide           Relevant Medications    tirzepatide (Mounjaro) 5 mg/0.5 mL pen injector       Pharmacy Follow Up: 5/13 @ 12:30 pm  PCP Follow Up: 6/12/2024    Jacque Garcia, Alvina    Continue all meds under the continuation of care with the referring provider and clinical pharmacy team.

## 2024-04-19 ENCOUNTER — PHARMACY VISIT (OUTPATIENT)
Dept: PHARMACY | Facility: CLINIC | Age: 74
End: 2024-04-19
Payer: COMMERCIAL

## 2024-04-19 DIAGNOSIS — I10 ESSENTIAL HYPERTENSION: ICD-10-CM

## 2024-04-19 RX ORDER — LOSARTAN POTASSIUM 25 MG/1
50 TABLET ORAL DAILY
Qty: 60 TABLET | Refills: 0 | Status: SHIPPED | OUTPATIENT
Start: 2024-04-19 | End: 2024-04-23 | Stop reason: SDUPTHER

## 2024-04-23 DIAGNOSIS — I10 ESSENTIAL HYPERTENSION: ICD-10-CM

## 2024-04-23 RX ORDER — LOSARTAN POTASSIUM 25 MG/1
50 TABLET ORAL DAILY
Qty: 200 TABLET | Refills: 0 | Status: SHIPPED | OUTPATIENT
Start: 2024-04-23 | End: 2024-06-05 | Stop reason: WASHOUT

## 2024-04-26 PROCEDURE — RXMED WILLOW AMBULATORY MEDICATION CHARGE

## 2024-05-01 ENCOUNTER — PHARMACY VISIT (OUTPATIENT)
Dept: PHARMACY | Facility: CLINIC | Age: 74
End: 2024-05-01
Payer: COMMERCIAL

## 2024-05-13 ENCOUNTER — TELEMEDICINE (OUTPATIENT)
Dept: PHARMACY | Facility: HOSPITAL | Age: 74
End: 2024-05-13
Payer: MEDICARE

## 2024-05-13 DIAGNOSIS — E11.22 TYPE 2 DIABETES MELLITUS WITH STAGE 4 CHRONIC KIDNEY DISEASE AND HYPERTENSION (MULTI): Primary | ICD-10-CM

## 2024-05-13 DIAGNOSIS — I12.9 TYPE 2 DIABETES MELLITUS WITH STAGE 4 CHRONIC KIDNEY DISEASE AND HYPERTENSION (MULTI): Primary | ICD-10-CM

## 2024-05-13 DIAGNOSIS — N18.4 TYPE 2 DIABETES MELLITUS WITH STAGE 4 CHRONIC KIDNEY DISEASE AND HYPERTENSION (MULTI): Primary | ICD-10-CM

## 2024-05-13 RX ORDER — TIRZEPATIDE 5 MG/.5ML
5 INJECTION, SOLUTION SUBCUTANEOUS
Qty: 2 ML | Refills: 0 | Status: SHIPPED | OUTPATIENT
Start: 2024-05-13

## 2024-05-13 NOTE — ASSESSMENT & PLAN NOTE
Patient's A1c is significantly improved, with last reading of 7.5% (goal <7%). Patient is doing well on the Mounjaro 5 mg after 1 month. She would like to stay on current dose at this time. She is reporting all morning blood sugars <130. We agree to hold current dose and follow again in another month.    Plan:   CONTINUE Mounjaro 5 mg weekly   Rx sent to [facility] for PAP  CONTINUE Farxiga and glipizide

## 2024-05-13 NOTE — PROGRESS NOTES
Subjective   Patient ID: Renee Candelario is a 74 y.o. female who presents for Diabetes    Referring Provider: MADELIN Najera     Diabetes  She presents for her follow-up diabetic visit. She has type 2 diabetes mellitus. Her disease course has been improving. Compliance with diabetes treatment: Farxiga 10 mg, glipizide 10 mg daily, Mounjaro 5 mg weekly.     Morning sugars between 110-130    Objective     There were no vitals taken for this visit.     Labs  Lab Results   Component Value Date    BILITOT 0.4 03/21/2024    CALCIUM 9.4 03/21/2024    CO2 24 03/21/2024     03/21/2024    CREATININE 1.61 (H) 03/21/2024    GLUCOSE 126 (H) 03/21/2024    ALKPHOS 99 03/21/2024    K 4.1 03/21/2024    PROT 6.3 (L) 03/21/2024     03/21/2024    AST 12 03/21/2024    ALT 12 03/21/2024    BUN 26 (H) 03/21/2024    ANIONGAP 15 03/21/2024    PHOS 5.0 (H) 03/21/2024    ALBUMIN 3.7 03/21/2024    LIPASE 36 07/01/2019    GFRF 36 (A) 07/31/2023     Lab Results   Component Value Date    TRIG 171 (H) 10/30/2023    CHOL 188 10/30/2023    LDLCALC 110 (H) 10/30/2023    HDL 43.7 10/30/2023     Lab Results   Component Value Date    HGBA1C 7.5 (A) 03/13/2024       Current Outpatient Medications on File Prior to Visit   Medication Sig Dispense Refill    albuterol 90 mcg/actuation inhaler Inhale 2 puffs every 6 hours if needed for wheezing or shortness of breath. 18 g 2    aspirin 81 mg EC tablet Take 1 tablet (81 mg) by mouth once daily. 90 tablet 1    atorvastatin (Lipitor) 80 mg tablet Take 1 tablet (80 mg) by mouth once daily. 90 tablet 1    dapagliflozin propanediol (Farxiga) 10 mg Take 1 tablet (10 mg) by mouth once daily. 30 tablet 11    fluticasone propion-salmeteroL (Advair Diskus) 250-50 mcg/dose diskus inhaler Inhale 1 puff 2 times a day. 60 each 11    FreeStyle Scarlet 2 [place] misc USE TWICE DAILY      FreeStyle Scarlet sensor system (FreeStyle Scarlet 2 Sensor) kit Use as instructed. Replace sensor every 14 days 2 each 11     glipiZIDE XL (Glucotrol XL) 10 mg 24 hr tablet Take 1 tablet (10 mg) by mouth once daily with a meal. 90 tablet 1    losartan (Cozaar) 25 mg tablet Take 2 tablets (50 mg) by mouth once daily. 200 tablet 0    metoprolol succinate XL (Toprol-XL) 50 mg 24 hr tablet Take 1 tablet (50 mg) by mouth once daily. 90 tablet 1    multivit-min/ferrous fumarate (MULTI VITAMIN ORAL) Take 1 tablet by mouth once daily.      [DISCONTINUED] tirzepatide (Mounjaro) 5 mg/0.5 mL pen injector Inject 5 mg under the skin 1 (one) time per week. 2 mL 0     No current facility-administered medications on file prior to visit.        Assessment/Plan   Problem List Items Addressed This Visit       Type 2 diabetes mellitus with stage 4 chronic kidney disease and hypertension (Multi) - Primary     Patient's A1c is significantly improved, with last reading of 7.5% (goal <7%). Patient is doing well on the Mounjaro 5 mg after 1 month. She would like to stay on current dose at this time. She is reporting all morning blood sugars <130. We agree to hold current dose and follow again in another month.    Plan:   CONTINUE Mounjaro 5 mg weekly   Rx sent to [facility] for PAP  CONTINUE Farxiga and glipizide           Relevant Medications    tirzepatide (Mounjaro) 5 mg/0.5 mL pen injector     Pharmacy Follow Up: 6/10 @ 12:30 pm  PCP Follow Up: 6/5/2024    Jacque Garcia, PharmD    Continue all meds under the continuation of care with the referring provider and clinical pharmacy team.

## 2024-05-15 PROCEDURE — RXMED WILLOW AMBULATORY MEDICATION CHARGE

## 2024-05-17 ENCOUNTER — PHARMACY VISIT (OUTPATIENT)
Dept: PHARMACY | Facility: CLINIC | Age: 74
End: 2024-05-17
Payer: COMMERCIAL

## 2024-05-20 ENCOUNTER — PHARMACY VISIT (OUTPATIENT)
Dept: PHARMACY | Facility: CLINIC | Age: 74
End: 2024-05-20
Payer: COMMERCIAL

## 2024-05-20 PROCEDURE — RXMED WILLOW AMBULATORY MEDICATION CHARGE

## 2024-06-05 ENCOUNTER — OFFICE VISIT (OUTPATIENT)
Dept: PRIMARY CARE | Facility: CLINIC | Age: 74
End: 2024-06-05
Payer: MEDICARE

## 2024-06-05 VITALS
BODY MASS INDEX: 43.65 KG/M2 | DIASTOLIC BLOOD PRESSURE: 80 MMHG | HEIGHT: 65 IN | SYSTOLIC BLOOD PRESSURE: 140 MMHG | WEIGHT: 262 LBS

## 2024-06-05 DIAGNOSIS — I12.9 TYPE 2 DIABETES MELLITUS WITH STAGE 4 CHRONIC KIDNEY DISEASE AND HYPERTENSION (MULTI): ICD-10-CM

## 2024-06-05 DIAGNOSIS — E11.22 TYPE 2 DIABETES MELLITUS WITH STAGE 4 CHRONIC KIDNEY DISEASE AND HYPERTENSION (MULTI): ICD-10-CM

## 2024-06-05 DIAGNOSIS — J44.89 ASTHMA WITH CHRONIC OBSTRUCTIVE PULMONARY DISEASE (COPD) (MULTI): ICD-10-CM

## 2024-06-05 DIAGNOSIS — I10 ESSENTIAL HYPERTENSION: ICD-10-CM

## 2024-06-05 DIAGNOSIS — E78.00 HYPERCHOLESTEROLEMIA: ICD-10-CM

## 2024-06-05 DIAGNOSIS — N18.4 TYPE 2 DIABETES MELLITUS WITH STAGE 4 CHRONIC KIDNEY DISEASE AND HYPERTENSION (MULTI): ICD-10-CM

## 2024-06-05 DIAGNOSIS — Z12.31 SCREENING MAMMOGRAM FOR BREAST CANCER: Primary | ICD-10-CM

## 2024-06-05 DIAGNOSIS — J45.40 MODERATE PERSISTENT ASTHMA WITHOUT COMPLICATION (HHS-HCC): ICD-10-CM

## 2024-06-05 PROCEDURE — 1160F RVW MEDS BY RX/DR IN RCRD: CPT

## 2024-06-05 PROCEDURE — 4010F ACE/ARB THERAPY RXD/TAKEN: CPT

## 2024-06-05 PROCEDURE — 1036F TOBACCO NON-USER: CPT

## 2024-06-05 PROCEDURE — 99214 OFFICE O/P EST MOD 30 MIN: CPT

## 2024-06-05 PROCEDURE — 3061F NEG MICROALBUMINURIA REV: CPT

## 2024-06-05 PROCEDURE — 3008F BODY MASS INDEX DOCD: CPT

## 2024-06-05 PROCEDURE — 1159F MED LIST DOCD IN RCRD: CPT

## 2024-06-05 PROCEDURE — 3079F DIAST BP 80-89 MM HG: CPT

## 2024-06-05 PROCEDURE — 3077F SYST BP >= 140 MM HG: CPT

## 2024-06-05 RX ORDER — DAPAGLIFLOZIN 10 MG/1
10 TABLET, FILM COATED ORAL DAILY
Qty: 30 TABLET | Refills: 11 | Status: SHIPPED | OUTPATIENT
Start: 2024-06-05 | End: 2025-05-31

## 2024-06-05 RX ORDER — GLIPIZIDE 10 MG/1
10 TABLET, FILM COATED, EXTENDED RELEASE ORAL
Qty: 90 TABLET | Refills: 1 | Status: SHIPPED | OUTPATIENT
Start: 2024-06-05

## 2024-06-05 RX ORDER — METOPROLOL SUCCINATE 50 MG/1
50 TABLET, EXTENDED RELEASE ORAL DAILY
Qty: 90 TABLET | Refills: 1 | Status: SHIPPED | OUTPATIENT
Start: 2024-06-05 | End: 2024-12-02

## 2024-06-05 RX ORDER — LOSARTAN POTASSIUM 50 MG/1
50 TABLET ORAL DAILY
Qty: 90 TABLET | Refills: 0 | Status: SHIPPED | OUTPATIENT
Start: 2024-06-05 | End: 2024-09-03

## 2024-06-05 RX ORDER — ATORVASTATIN CALCIUM 80 MG/1
80 TABLET, FILM COATED ORAL DAILY
Qty: 90 TABLET | Refills: 1 | Status: SHIPPED | OUTPATIENT
Start: 2024-06-05 | End: 2024-12-02

## 2024-06-05 RX ORDER — BLOOD-GLUCOSE SENSOR
EACH MISCELLANEOUS
Qty: 2 EACH | Refills: 2 | Status: SHIPPED | OUTPATIENT
Start: 2024-06-05

## 2024-06-05 RX ORDER — BLOOD-GLUCOSE,RECEIVER,CONT
EACH MISCELLANEOUS
Qty: 1 EACH | Refills: 0 | Status: SHIPPED | OUTPATIENT
Start: 2024-06-05

## 2024-06-05 ASSESSMENT — PATIENT HEALTH QUESTIONNAIRE - PHQ9
2. FEELING DOWN, DEPRESSED OR HOPELESS: NOT AT ALL
1. LITTLE INTEREST OR PLEASURE IN DOING THINGS: NOT AT ALL
SUM OF ALL RESPONSES TO PHQ9 QUESTIONS 1 AND 2: 0

## 2024-06-05 NOTE — PATIENT INSTRUCTIONS
Please schedule an appointment with nephrology (kidney doctor): [phone]   Please schedule mammogram: [phone]   Please schedule colonoscopy [phone]

## 2024-06-05 NOTE — PROGRESS NOTES
"Subjective   Patient ID: Renee Candelario is a 74 y.o. female who presents for Follow-up.  HPI  73 year old female with PMH of T2DM, HTN, HLD, CKD stage 4, presents for follow up.      DM2: glipizide XL 10mg, farxiga 10mg, and ozempic 2mg weekly. Uses CGM. Blood sugars improving. A1C 4/14/23 10.7 --> 7/31/23 8.7 --> 10/30/23 7.7. --> 3/13/24 A1C 7.5.  Stopped metformin d/t low GFR. Continues to refuse insulin.   HTN: losartan 50mg, metoprolol 50mg --> had been taking 25 losartan and no metoprolol for the past few weeks, unclear reason.   HLD: Atorvastatin 80mg daily  CKD: stage four, GFR 27, needs to establish care with nephrology.   Asthma: Advair diskus BID, albuterol PRN. Usually needing inhaler <2x weekly.     Still has not scheduled with nephrology, needs to do so.   Due for optho appointment.     All systems have been reviewed and are negative for complaint other than those mentioned in the HPI.     Objective   /80 (BP Location: Left arm, Patient Position: Sitting, BP Cuff Size: Large adult)   Ht 1.651 m (5' 5\")   Wt 119 kg (262 lb)   BMI 43.60 kg/m²    Physical Exam  Constitutional:       General: She is awake.      Appearance: Normal appearance.   HENT:      Head: Normocephalic and atraumatic.   Eyes:      Extraocular Movements: Extraocular movements intact.      Pupils: Pupils are equal, round, and reactive to light.   Cardiovascular:      Rate and Rhythm: Normal rate and regular rhythm.      Heart sounds: S1 normal and S2 normal. No murmur heard.  Pulmonary:      Effort: Pulmonary effort is normal.      Breath sounds: Normal breath sounds.   Musculoskeletal:      Cervical back: Normal range of motion and neck supple.      Right lower leg: No edema.      Left lower leg: No edema.   Skin:     General: Skin is warm and dry.   Neurological:      General: No focal deficit present.      Mental Status: She is alert and oriented to person, place, and time.   Psychiatric:         Mood and Affect: Mood and " affect normal.         Behavior: Behavior normal. Behavior is cooperative.         Thought Content: Thought content normal.         Judgment: Judgment normal.     Renee was seen today for follow-up.  Diagnoses and all orders for this visit:  Screening mammogram for breast cancer (Primary)  -     BI mammo bilateral screening tomosynthesis; Future  Essential hypertension  -     Not at goal  - Patient had lost medication, was reordered as 2 25mg pills daily, has only been taking 1 pill.   - Sending rx for 50mg   - Also had lost metoprolol, patient to restart  - BP slightly above goal today, will likely normalize when taking medication   - losartan (Cozaar) 50 mg tablet; Take 1 tablet (50 mg) by mouth once daily.  -     metoprolol succinate XL (Toprol-XL) 50 mg 24 hr tablet; Take 1 tablet (50 mg) by mouth once daily.  Type 2 diabetes mellitus with stage 4 chronic kidney disease and hypertension (Multi)  -     Due for A1C recheck next week, patient to have drawn  - Often forgets to tap freestyle scarlet 2, would benefit from scarlet 3.   - Overall sugars looking good.   - Increase mounjaro to 7.5mg at next refill.    - Due for optho, patient to schedule   - glipiZIDE XL (Glucotrol XL) 10 mg 24 hr tablet; Take 1 tablet (10 mg) by mouth once daily with breakfast.  -     dapagliflozin propanediol (Farxiga) 10 mg; Take 1 tablet (10 mg) by mouth once daily.  -     FreeStyle Scarlet 3 [place] misc; Use as instructed  -     FreeStyle Scarlet 3 Sensor device; Use for 14 days.  -     Hemoglobin A1C; Future  -     Referral to Ophthalmology; Future  Hypercholesterolemia  -     Stable, continue current medication   - atorvastatin (Lipitor) 80 mg tablet; Take 1 tablet (80 mg) by mouth once daily.  Moderate persistent asthma without complication (HHS-HCC)   - Stable, continue current medication.     Follow up in 3 months.

## 2024-06-10 ENCOUNTER — TELEMEDICINE (OUTPATIENT)
Dept: PHARMACY | Facility: HOSPITAL | Age: 74
End: 2024-06-10
Payer: MEDICARE

## 2024-06-10 DIAGNOSIS — E11.22 TYPE 2 DIABETES MELLITUS WITH STAGE 4 CHRONIC KIDNEY DISEASE AND HYPERTENSION (MULTI): Primary | ICD-10-CM

## 2024-06-10 DIAGNOSIS — N18.4 TYPE 2 DIABETES MELLITUS WITH STAGE 4 CHRONIC KIDNEY DISEASE AND HYPERTENSION (MULTI): Primary | ICD-10-CM

## 2024-06-10 DIAGNOSIS — I12.9 TYPE 2 DIABETES MELLITUS WITH STAGE 4 CHRONIC KIDNEY DISEASE AND HYPERTENSION (MULTI): Primary | ICD-10-CM

## 2024-06-10 PROCEDURE — RXMED WILLOW AMBULATORY MEDICATION CHARGE

## 2024-06-10 RX ORDER — TIRZEPATIDE 7.5 MG/.5ML
7.5 INJECTION, SOLUTION SUBCUTANEOUS
Qty: 2 ML | Refills: 1 | Status: SHIPPED | OUTPATIENT
Start: 2024-06-11

## 2024-06-10 NOTE — PROGRESS NOTES
Subjective   Patient ID: Renee Candelario is a 74 y.o. female who presents for Diabetes    Referring Provider: MADELIN Najera     Diabetes  She presents for her follow-up diabetic visit. She has type 2 diabetes mellitus. Her disease course has been improving. Compliance with diabetes treatment: Farxiga 10 mg, glipizide 10 mg daily, Mounjaro 5 mg weekly.     Morning sugars between 80s-120s  Denies any low blood sugars    Diet:  Breakfast: eggs, umanzor, grits, oatmeal, toast  Lunch/Dinner: Meat, vegetables  Drinks: water, zero sugar soda    Objective     There were no vitals taken for this visit.     Labs  Lab Results   Component Value Date    BILITOT 0.4 03/21/2024    CALCIUM 9.4 03/21/2024    CO2 24 03/21/2024     03/21/2024    CREATININE 1.61 (H) 03/21/2024    GLUCOSE 126 (H) 03/21/2024    ALKPHOS 99 03/21/2024    K 4.1 03/21/2024    PROT 6.3 (L) 03/21/2024     03/21/2024    AST 12 03/21/2024    ALT 12 03/21/2024    BUN 26 (H) 03/21/2024    ANIONGAP 15 03/21/2024    PHOS 5.0 (H) 03/21/2024    ALBUMIN 3.7 03/21/2024    LIPASE 36 07/01/2019    GFRF 36 (A) 07/31/2023     Lab Results   Component Value Date    TRIG 171 (H) 10/30/2023    CHOL 188 10/30/2023    LDLCALC 110 (H) 10/30/2023    HDL 43.7 10/30/2023     Lab Results   Component Value Date    HGBA1C 7.5 (A) 03/13/2024       Current Outpatient Medications on File Prior to Visit   Medication Sig Dispense Refill    albuterol 90 mcg/actuation inhaler Inhale 2 puffs every 6 hours if needed for wheezing or shortness of breath. 18 g 2    aspirin 81 mg EC tablet Take 1 tablet (81 mg) by mouth once daily. 90 tablet 1    atorvastatin (Lipitor) 80 mg tablet Take 1 tablet (80 mg) by mouth once daily. 90 tablet 1    dapagliflozin propanediol (Farxiga) 10 mg Take 1 tablet (10 mg) by mouth once daily. 30 tablet 11    fluticasone propion-salmeteroL (Advair Diskus) 250-50 mcg/dose diskus inhaler Inhale 1 puff 2 times a day. 60 each 11    FreeStyle Scarlet 2 [place]  misc USE TWICE DAILY      FreeStyle Scarlet 3 [place] misc Use as instructed 1 each 0    FreeStyle Scarlet 3 Sensor device Use for 14 days. 2 each 2    FreeStyle Scarlet sensor system (FreeStyle Scarlet 2 Sensor) kit Use as instructed. Replace sensor every 14 days 2 each 11    glipiZIDE XL (Glucotrol XL) 10 mg 24 hr tablet Take 1 tablet (10 mg) by mouth once daily with breakfast. 90 tablet 1    losartan (Cozaar) 50 mg tablet Take 1 tablet (50 mg) by mouth once daily. 90 tablet 0    metoprolol succinate XL (Toprol-XL) 50 mg 24 hr tablet Take 1 tablet (50 mg) by mouth once daily. 90 tablet 1    multivit-min/ferrous fumarate (MULTI VITAMIN ORAL) Take 1 tablet by mouth once daily.      tirzepatide (Mounjaro) 5 mg/0.5 mL pen injector Inject 5 mg under the skin 1 (one) time per week. 2 mL 0    [DISCONTINUED] atorvastatin (Lipitor) 80 mg tablet Take 1 tablet (80 mg) by mouth once daily. 90 tablet 1    [DISCONTINUED] dapagliflozin propanediol (Farxiga) 10 mg Take 1 tablet (10 mg) by mouth once daily. 30 tablet 11    [DISCONTINUED] glipiZIDE XL (Glucotrol XL) 10 mg 24 hr tablet Take 1 tablet (10 mg) by mouth once daily with a meal. 90 tablet 1    [DISCONTINUED] losartan (Cozaar) 25 mg tablet Take 2 tablets (50 mg) by mouth once daily. 200 tablet 0    [DISCONTINUED] metoprolol succinate XL (Toprol-XL) 50 mg 24 hr tablet Take 1 tablet (50 mg) by mouth once daily. 90 tablet 1     No current facility-administered medications on file prior to visit.        Assessment/Plan   Problem List Items Addressed This Visit       Type 2 diabetes mellitus with stage 4 chronic kidney disease and hypertension (Multi) - Primary    Relevant Medications    tirzepatide (Mounjaro) 7.5 mg/0.5 mL pen injector (Start on 6/11/2024)     Plan:   Patient's A1c is significantly improved, with last reading of 7.5% (goal <7%). Patient is doing well on the Mounjaro 5 mg after 2 months. Reports she is not having appetite suppression like she did at the start of the 5  mg dose.  She is reporting all morning blood sugars <130.   Patient states that she switched to Freestyle Scarlet 3 but does not have on currently, had to leave off for procedure at the end of this week.    Plan:   INCREASE Mounjaro 7.5 mg weekly   Rx sent to [facility] for PAP  Discussed side effects, encouraged adequate water intake  CONTINUE Farxiga and glipizide      Pharmacy Follow Up: 7/8/24 @ 12:30 pm  PCP Follow Up: 9/4/2024    Osiris ScanlonD  Clinical Pharmacy Specialist, Primary Care   [phone]    Continue all meds under the continuation of care with the referring provider and clinical pharmacy team.

## 2024-06-12 ENCOUNTER — APPOINTMENT (OUTPATIENT)
Dept: PRIMARY CARE | Facility: CLINIC | Age: 74
End: 2024-06-12
Payer: MEDICARE

## 2024-06-13 ENCOUNTER — PHARMACY VISIT (OUTPATIENT)
Dept: PHARMACY | Facility: CLINIC | Age: 74
End: 2024-06-13
Payer: COMMERCIAL

## 2024-06-14 ENCOUNTER — HOSPITAL ENCOUNTER (OUTPATIENT)
Dept: RADIOLOGY | Facility: CLINIC | Age: 74
Discharge: HOME | End: 2024-06-14
Payer: MEDICARE

## 2024-06-14 ENCOUNTER — LAB (OUTPATIENT)
Dept: LAB | Facility: LAB | Age: 74
End: 2024-06-14
Payer: MEDICARE

## 2024-06-14 VITALS — HEIGHT: 65 IN | WEIGHT: 262.35 LBS | BODY MASS INDEX: 43.71 KG/M2

## 2024-06-14 DIAGNOSIS — E11.22 TYPE 2 DIABETES MELLITUS WITH STAGE 4 CHRONIC KIDNEY DISEASE AND HYPERTENSION (MULTI): ICD-10-CM

## 2024-06-14 DIAGNOSIS — Z12.31 SCREENING MAMMOGRAM FOR BREAST CANCER: ICD-10-CM

## 2024-06-14 DIAGNOSIS — I12.9 TYPE 2 DIABETES MELLITUS WITH STAGE 4 CHRONIC KIDNEY DISEASE AND HYPERTENSION (MULTI): ICD-10-CM

## 2024-06-14 DIAGNOSIS — N18.4 TYPE 2 DIABETES MELLITUS WITH STAGE 4 CHRONIC KIDNEY DISEASE AND HYPERTENSION (MULTI): ICD-10-CM

## 2024-06-14 LAB
EST. AVERAGE GLUCOSE BLD GHB EST-MCNC: 148 MG/DL
HBA1C MFR BLD: 6.8 %

## 2024-06-14 PROCEDURE — 77067 SCR MAMMO BI INCL CAD: CPT

## 2024-06-14 PROCEDURE — 36415 COLL VENOUS BLD VENIPUNCTURE: CPT

## 2024-06-14 PROCEDURE — 83036 HEMOGLOBIN GLYCOSYLATED A1C: CPT

## 2024-06-19 DIAGNOSIS — N18.4 TYPE 2 DIABETES MELLITUS WITH STAGE 4 CHRONIC KIDNEY DISEASE AND HYPERTENSION (MULTI): ICD-10-CM

## 2024-06-19 DIAGNOSIS — E11.22 TYPE 2 DIABETES MELLITUS WITH STAGE 4 CHRONIC KIDNEY DISEASE AND HYPERTENSION (MULTI): ICD-10-CM

## 2024-06-19 DIAGNOSIS — I12.9 TYPE 2 DIABETES MELLITUS WITH STAGE 4 CHRONIC KIDNEY DISEASE AND HYPERTENSION (MULTI): ICD-10-CM

## 2024-06-19 DIAGNOSIS — Z12.11 COLON CANCER SCREENING: ICD-10-CM

## 2024-06-19 RX ORDER — TIRZEPATIDE 5 MG/.5ML
5 INJECTION, SOLUTION SUBCUTANEOUS
Qty: 2 ML | Refills: 0 | OUTPATIENT
Start: 2024-06-23

## 2024-06-25 DIAGNOSIS — N18.4 TYPE 2 DIABETES MELLITUS WITH STAGE 4 CHRONIC KIDNEY DISEASE AND HYPERTENSION (MULTI): ICD-10-CM

## 2024-06-25 DIAGNOSIS — I12.9 TYPE 2 DIABETES MELLITUS WITH STAGE 4 CHRONIC KIDNEY DISEASE AND HYPERTENSION (MULTI): ICD-10-CM

## 2024-06-25 DIAGNOSIS — E11.22 TYPE 2 DIABETES MELLITUS WITH STAGE 4 CHRONIC KIDNEY DISEASE AND HYPERTENSION (MULTI): ICD-10-CM

## 2024-06-25 PROCEDURE — RXMED WILLOW AMBULATORY MEDICATION CHARGE

## 2024-06-25 RX ORDER — DAPAGLIFLOZIN 10 MG/1
10 TABLET, FILM COATED ORAL DAILY
Qty: 30 TABLET | Refills: 11 | Status: SHIPPED | OUTPATIENT
Start: 2024-06-25 | End: 2025-06-20

## 2024-06-27 ENCOUNTER — PHARMACY VISIT (OUTPATIENT)
Dept: PHARMACY | Facility: CLINIC | Age: 74
End: 2024-06-27
Payer: COMMERCIAL

## 2024-06-28 LAB — NONINV COLON CA DNA+OCC BLD SCRN STL QL: NORMAL

## 2024-07-08 ENCOUNTER — APPOINTMENT (OUTPATIENT)
Dept: PHARMACY | Facility: HOSPITAL | Age: 74
End: 2024-07-08
Payer: MEDICARE

## 2024-07-08 DIAGNOSIS — E11.22 TYPE 2 DIABETES MELLITUS WITH STAGE 4 CHRONIC KIDNEY DISEASE AND HYPERTENSION (MULTI): Primary | ICD-10-CM

## 2024-07-08 DIAGNOSIS — I12.9 TYPE 2 DIABETES MELLITUS WITH STAGE 4 CHRONIC KIDNEY DISEASE AND HYPERTENSION (MULTI): Primary | ICD-10-CM

## 2024-07-08 DIAGNOSIS — N18.4 TYPE 2 DIABETES MELLITUS WITH STAGE 4 CHRONIC KIDNEY DISEASE AND HYPERTENSION (MULTI): Primary | ICD-10-CM

## 2024-07-08 PROCEDURE — RXMED WILLOW AMBULATORY MEDICATION CHARGE

## 2024-07-08 RX ORDER — TIRZEPATIDE 7.5 MG/.5ML
7.5 INJECTION, SOLUTION SUBCUTANEOUS
Qty: 2 ML | Refills: 0 | Status: SHIPPED | OUTPATIENT
Start: 2024-07-14

## 2024-07-08 NOTE — PROGRESS NOTES
Subjective   Patient ID: Renee Candelario is a 74 y.o. female who presents for Diabetes    Referring Provider: MADELIN Najera     Diabetes  She presents for her follow-up diabetic visit. She has type 2 diabetes mellitus. Her disease course has been improving. Compliance with diabetes treatment: Farxiga 10 mg, glipizide 10 mg daily, Mounjaro 7.5 mg weekly.     Morning sugars between 120-140s  Denies any low blood sugars    Diet:   Breakfast: eggs occasionally, umanzor, grits, oatmeal, toast  Lunch/Dinner: Meat, chicken, vegetables  Drinks: water, zero sugar soda    Objective     There were no vitals taken for this visit.     Labs  Lab Results   Component Value Date    BILITOT 0.4 03/21/2024    CALCIUM 9.4 03/21/2024    CO2 24 03/21/2024     03/21/2024    CREATININE 1.61 (H) 03/21/2024    GLUCOSE 126 (H) 03/21/2024    ALKPHOS 99 03/21/2024    K 4.1 03/21/2024    PROT 6.3 (L) 03/21/2024     03/21/2024    AST 12 03/21/2024    ALT 12 03/21/2024    BUN 26 (H) 03/21/2024    ANIONGAP 15 03/21/2024    PHOS 5.0 (H) 03/21/2024    ALBUMIN 3.7 03/21/2024    LIPASE 36 07/01/2019    GFRF 36 (A) 07/31/2023     Lab Results   Component Value Date    TRIG 171 (H) 10/30/2023    CHOL 188 10/30/2023    LDLCALC 110 (H) 10/30/2023    HDL 43.7 10/30/2023     Lab Results   Component Value Date    HGBA1C 6.8 (H) 06/14/2024       Current Outpatient Medications on File Prior to Visit   Medication Sig Dispense Refill    albuterol 90 mcg/actuation inhaler Inhale 2 puffs every 6 hours if needed for wheezing or shortness of breath. 18 g 2    aspirin 81 mg EC tablet Take 1 tablet (81 mg) by mouth once daily. 90 tablet 1    atorvastatin (Lipitor) 80 mg tablet Take 1 tablet (80 mg) by mouth once daily. 90 tablet 1    dapagliflozin propanediol (Farxiga) 10 mg Take 1 tablet (10 mg) by mouth once daily. 30 tablet 11    fluticasone propion-salmeteroL (Advair Diskus) 250-50 mcg/dose diskus inhaler Inhale 1 puff 2 times a day. 60 each 11     FreeStyle Scarlet 2 [place] misc USE TWICE DAILY      FreeStyle Scarlet 3 [place] misc Use as instructed 1 each 0    FreeStyle Scarlet 3 Sensor device Use for 14 days. 2 each 2    FreeStyle Scarlet sensor system (FreeStyle Scarlet 2 Sensor) kit Use as instructed. Replace sensor every 14 days 2 each 11    glipiZIDE XL (Glucotrol XL) 10 mg 24 hr tablet Take 1 tablet (10 mg) by mouth once daily with breakfast. 90 tablet 1    losartan (Cozaar) 50 mg tablet Take 1 tablet (50 mg) by mouth once daily. 90 tablet 0    metoprolol succinate XL (Toprol-XL) 50 mg 24 hr tablet Take 1 tablet (50 mg) by mouth once daily. 90 tablet 1    multivit-min/ferrous fumarate (MULTI VITAMIN ORAL) Take 1 tablet by mouth once daily.      tirzepatide (Mounjaro) 5 mg/0.5 mL pen injector Inject 5 mg under the skin 1 (one) time per week. 2 mL 0    tirzepatide (Mounjaro) 7.5 mg/0.5 mL pen injector Inject 7.5 mg under the skin 1 (one) time per week. 2 mL 1     No current facility-administered medications on file prior to visit.        Assessment/Plan   Problem List Items Addressed This Visit       Type 2 diabetes mellitus with stage 4 chronic kidney disease and hypertension (Multi) - Primary    Relevant Medications    tirzepatide (Mounjaro) 7.5 mg/0.5 mL pen injector (Start on 7/14/2024)    Other Relevant Orders    Follow Up In Clinical Pharmacy       Plan:   Patient's A1c is significantly improved, with last reading of 6.8% (goal <7%). Patient recent dose increase to Mounjaro 7.5 mg weekly, denies side effects and reports similar appetite suppression with -140 and no hypoglycemia.    Plan:   Continue Mounjaro 7.5 mg weekly   Refill sent to [facility] for PAP  Will need PAP renewal at follow up  Discussed side effects, encouraged adequate water intake  CONTINUE Farxiga and glipizide      Pharmacy Follow Up: 9/10/24 @ 12:30 pm  PCP Follow Up: 9/4/2024    Beverly Hernandez PharmD  Clinical Pharmacy Specialist, Primary Care   [phone]    Continue all meds  under the continuation of care with the referring provider and clinical pharmacy team.

## 2024-07-09 ENCOUNTER — PHARMACY VISIT (OUTPATIENT)
Dept: PHARMACY | Facility: CLINIC | Age: 74
End: 2024-07-09
Payer: COMMERCIAL

## 2024-07-10 LAB — NONINV COLON CA DNA+OCC BLD SCRN STL QL: NORMAL

## 2024-08-09 DIAGNOSIS — E11.22 TYPE 2 DIABETES MELLITUS WITH STAGE 4 CHRONIC KIDNEY DISEASE AND HYPERTENSION (MULTI): ICD-10-CM

## 2024-08-09 DIAGNOSIS — I12.9 TYPE 2 DIABETES MELLITUS WITH STAGE 4 CHRONIC KIDNEY DISEASE AND HYPERTENSION (MULTI): ICD-10-CM

## 2024-08-09 DIAGNOSIS — N18.4 TYPE 2 DIABETES MELLITUS WITH STAGE 4 CHRONIC KIDNEY DISEASE AND HYPERTENSION (MULTI): ICD-10-CM

## 2024-08-09 PROCEDURE — RXMED WILLOW AMBULATORY MEDICATION CHARGE

## 2024-08-09 RX ORDER — TIRZEPATIDE 7.5 MG/.5ML
7.5 INJECTION, SOLUTION SUBCUTANEOUS
Qty: 2 ML | Refills: 1 | Status: SHIPPED | OUTPATIENT
Start: 2024-08-11

## 2024-08-13 ENCOUNTER — PHARMACY VISIT (OUTPATIENT)
Dept: PHARMACY | Facility: CLINIC | Age: 74
End: 2024-08-13
Payer: COMMERCIAL

## 2024-08-29 DIAGNOSIS — N18.4 TYPE 2 DIABETES MELLITUS WITH STAGE 4 CHRONIC KIDNEY DISEASE AND HYPERTENSION (MULTI): ICD-10-CM

## 2024-08-29 DIAGNOSIS — E11.22 TYPE 2 DIABETES MELLITUS WITH STAGE 4 CHRONIC KIDNEY DISEASE AND HYPERTENSION (MULTI): ICD-10-CM

## 2024-08-29 DIAGNOSIS — I10 ESSENTIAL HYPERTENSION: ICD-10-CM

## 2024-08-29 DIAGNOSIS — I12.9 TYPE 2 DIABETES MELLITUS WITH STAGE 4 CHRONIC KIDNEY DISEASE AND HYPERTENSION (MULTI): ICD-10-CM

## 2024-08-29 RX ORDER — BLOOD-GLUCOSE SENSOR
EACH MISCELLANEOUS
Qty: 2 EACH | Refills: 2 | Status: SHIPPED | OUTPATIENT
Start: 2024-08-29

## 2024-08-29 RX ORDER — LOSARTAN POTASSIUM 50 MG/1
50 TABLET ORAL DAILY
Qty: 90 TABLET | Refills: 0 | Status: SHIPPED | OUTPATIENT
Start: 2024-08-29 | End: 2024-11-27

## 2024-08-30 DIAGNOSIS — I12.9 TYPE 2 DIABETES MELLITUS WITH STAGE 4 CHRONIC KIDNEY DISEASE AND HYPERTENSION (MULTI): ICD-10-CM

## 2024-08-30 DIAGNOSIS — N18.4 TYPE 2 DIABETES MELLITUS WITH STAGE 4 CHRONIC KIDNEY DISEASE AND HYPERTENSION (MULTI): ICD-10-CM

## 2024-08-30 DIAGNOSIS — E11.22 TYPE 2 DIABETES MELLITUS WITH STAGE 4 CHRONIC KIDNEY DISEASE AND HYPERTENSION (MULTI): ICD-10-CM

## 2024-08-30 PROCEDURE — RXMED WILLOW AMBULATORY MEDICATION CHARGE

## 2024-09-03 ENCOUNTER — PHARMACY VISIT (OUTPATIENT)
Dept: PHARMACY | Facility: CLINIC | Age: 74
End: 2024-09-03
Payer: COMMERCIAL

## 2024-09-04 ENCOUNTER — APPOINTMENT (OUTPATIENT)
Dept: PRIMARY CARE | Facility: CLINIC | Age: 74
End: 2024-09-04
Payer: MEDICARE

## 2024-09-10 ENCOUNTER — APPOINTMENT (OUTPATIENT)
Dept: PHARMACY | Facility: HOSPITAL | Age: 74
End: 2024-09-10
Payer: MEDICARE

## 2024-09-10 DIAGNOSIS — N18.4 TYPE 2 DIABETES MELLITUS WITH STAGE 4 CHRONIC KIDNEY DISEASE AND HYPERTENSION (MULTI): ICD-10-CM

## 2024-09-10 DIAGNOSIS — E11.22 TYPE 2 DIABETES MELLITUS WITH STAGE 4 CHRONIC KIDNEY DISEASE AND HYPERTENSION (MULTI): ICD-10-CM

## 2024-09-10 DIAGNOSIS — J44.89 ASTHMA WITH CHRONIC OBSTRUCTIVE PULMONARY DISEASE (COPD) (MULTI): Primary | ICD-10-CM

## 2024-09-10 DIAGNOSIS — J45.40 MODERATE PERSISTENT ASTHMA WITHOUT COMPLICATION (HHS-HCC): ICD-10-CM

## 2024-09-10 DIAGNOSIS — I12.9 TYPE 2 DIABETES MELLITUS WITH STAGE 4 CHRONIC KIDNEY DISEASE AND HYPERTENSION (MULTI): ICD-10-CM

## 2024-09-10 PROCEDURE — RXMED WILLOW AMBULATORY MEDICATION CHARGE

## 2024-09-10 RX ORDER — TIRZEPATIDE 7.5 MG/.5ML
7.5 INJECTION, SOLUTION SUBCUTANEOUS
Qty: 2 ML | Refills: 0 | Status: SHIPPED | OUTPATIENT
Start: 2024-09-10 | End: 2024-09-11 | Stop reason: WASHOUT

## 2024-09-10 RX ORDER — FLUTICASONE PROPIONATE AND SALMETEROL 250; 50 UG/1; UG/1
1 POWDER RESPIRATORY (INHALATION)
Qty: 60 EACH | Refills: 11 | Status: SHIPPED | OUTPATIENT
Start: 2024-09-10

## 2024-09-10 NOTE — PROGRESS NOTES
Subjective   Patient ID: Renee Candelario is a 74 y.o. female who presents for Asthma, COPD, and Diabetes    Referring Provider: MADELIN Najera     Diabetes  She presents for her follow-up diabetic visit. She has type 2 diabetes mellitus. Her disease course has been improving. Compliance with diabetes treatment: Farxiga 10 mg, glipizide 10 mg daily, Mounjaro 7.5 mg weekly.     Morning sugars between 120-140s  Reports occasional hypoglycemic events in the middle of the night, states it is infrequent though and easily corrected by orange juice    Diet:   Breakfast: eggs occasionally, umanzor, grits, oatmeal, toast  Lunch/Dinner: Meat, chicken, vegetables  Drinks: water, zero sugar soda    Asthma/COPD  She reports good control of symptoms on maintenance Advair  Aggravating Factors: hot temperatures  DEMETRIS use: < or = 2 days/week  Reports significant cost barrier to Advair     Objective     There were no vitals taken for this visit.     Labs  Lab Results   Component Value Date    BILITOT 0.4 03/21/2024    CALCIUM 9.4 03/21/2024    CO2 24 03/21/2024     03/21/2024    CREATININE 1.61 (H) 03/21/2024    GLUCOSE 126 (H) 03/21/2024    ALKPHOS 99 03/21/2024    K 4.1 03/21/2024    PROT 6.3 (L) 03/21/2024     03/21/2024    AST 12 03/21/2024    ALT 12 03/21/2024    BUN 26 (H) 03/21/2024    ANIONGAP 15 03/21/2024    PHOS 5.0 (H) 03/21/2024    ALBUMIN 3.7 03/21/2024    LIPASE 36 07/01/2019    GFRF 36 (A) 07/31/2023     Lab Results   Component Value Date    TRIG 171 (H) 10/30/2023    CHOL 188 10/30/2023    LDLCALC 110 (H) 10/30/2023    HDL 43.7 10/30/2023     Lab Results   Component Value Date    HGBA1C 6.8 (H) 06/14/2024       Current Outpatient Medications on File Prior to Visit   Medication Sig Dispense Refill    albuterol 90 mcg/actuation inhaler Inhale 2 puffs every 6 hours if needed for wheezing or shortness of breath. 18 g 2    aspirin 81 mg EC tablet Take 1 tablet (81 mg) by mouth once daily. 90 tablet 1     atorvastatin (Lipitor) 80 mg tablet Take 1 tablet (80 mg) by mouth once daily. 90 tablet 1    dapagliflozin propanediol (Farxiga) 10 mg Take 1 tablet (10 mg) by mouth once daily. 30 tablet 11    FreeStyle Scarlet 2 [place] misc USE TWICE DAILY      FreeStyle Scarlet 3 [place] misc Use as instructed 1 each 0    FreeStyle Scarlet 3 Sensor device Use for 14 days. 2 each 2    FreeStyle Scarlet sensor system (FreeStyle Scarlet 2 Sensor) kit Use as instructed. Replace sensor every 14 days 2 each 11    glipiZIDE XL (Glucotrol XL) 10 mg 24 hr tablet Take 1 tablet (10 mg) by mouth once daily with breakfast. 90 tablet 1    losartan (Cozaar) 50 mg tablet Take 1 tablet (50 mg) by mouth once daily. 90 tablet 0    metoprolol succinate XL (Toprol-XL) 50 mg 24 hr tablet Take 1 tablet (50 mg) by mouth once daily. 90 tablet 1    multivit-min/ferrous fumarate (MULTI VITAMIN ORAL) Take 1 tablet by mouth once daily.      tirzepatide (Mounjaro) 5 mg/0.5 mL pen injector Inject 5 mg under the skin 1 (one) time per week. 2 mL 0    tirzepatide (Mounjaro) 7.5 mg/0.5 mL pen injector Inject 7.5 mg under the skin 1 (one) time per week. 2 mL 1    [DISCONTINUED] fluticasone propion-salmeteroL (Advair Diskus) 250-50 mcg/dose diskus inhaler Inhale 1 puff 2 times a day. 60 each 11    [DISCONTINUED] tirzepatide (Mounjaro) 7.5 mg/0.5 mL pen injector Inject 7.5 mg under the skin 1 (one) time per week. 2 mL 0     No current facility-administered medications on file prior to visit.        Assessment/Plan   Problem List Items Addressed This Visit       Moderate persistent asthma without complication (HHS-HCC)    Relevant Medications    fluticasone propion-salmeteroL (Advair Diskus) 250-50 mcg/dose diskus inhaler    Other Relevant Orders    Follow Up In Clinical Pharmacy    Type 2 diabetes mellitus with stage 4 chronic kidney disease and hypertension (Multi)    Relevant Medications    tirzepatide (Mounjaro) 7.5 mg/0.5 mL pen injector    Other Relevant Orders    Follow  Up In Clinical Pharmacy    Asthma with chronic obstructive pulmonary disease (COPD) (Multi) - Primary    Relevant Medications    fluticasone propion-salmeteroL (Advair Diskus) 250-50 mcg/dose diskus inhaler    Other Relevant Orders    Follow Up In Clinical Pharmacy       Plan: DM  Patient's A1c is significantly improved, with last reading of 6.8% (goal <7%). Patient recent dose increase to Mounjaro 7.5 mg weekly, denies side effects and reports similar appetite suppression with -140, does report occasional hypoglycemic events in the middle of the night, states it is infrequent though and easily corrected by orange juice.   Continue Mounjaro 7.5 mg weekly   Sent refill to [facility] for mail to patient, [facility]  CONTINUE Farxiga and glipizide  No refills needed at this time    Plan: Asthma/COPD  Patient reports continued control with Advair maintenance inhaler, requiring rescue albuterol infrequently  CONTINUE Advair   Patient requesting refill, sent to [facility] for mail to patient    [facility] renewal near due (through 11/2/24); patient to send financial documents via fax     Pharmacy Follow Up: 9/24/24 @ 12:15 pm  PCP Follow Up: 9/11/2024    Beverly Hernandez PharmD  Clinical Pharmacy Specialist, Primary Care   [phone]    Continue all meds under the continuation of care with the referring provider and clinical pharmacy team.

## 2024-09-11 ENCOUNTER — APPOINTMENT (OUTPATIENT)
Dept: PRIMARY CARE | Facility: CLINIC | Age: 74
End: 2024-09-11
Payer: MEDICARE

## 2024-09-11 VITALS
SYSTOLIC BLOOD PRESSURE: 128 MMHG | HEIGHT: 65 IN | DIASTOLIC BLOOD PRESSURE: 70 MMHG | WEIGHT: 261 LBS | BODY MASS INDEX: 43.49 KG/M2

## 2024-09-11 DIAGNOSIS — N18.4 TYPE 2 DIABETES MELLITUS WITH STAGE 4 CHRONIC KIDNEY DISEASE AND HYPERTENSION (MULTI): ICD-10-CM

## 2024-09-11 DIAGNOSIS — I12.9 TYPE 2 DIABETES MELLITUS WITH STAGE 4 CHRONIC KIDNEY DISEASE AND HYPERTENSION (MULTI): ICD-10-CM

## 2024-09-11 DIAGNOSIS — I10 ESSENTIAL HYPERTENSION: ICD-10-CM

## 2024-09-11 DIAGNOSIS — E11.22 TYPE 2 DIABETES MELLITUS WITH STAGE 4 CHRONIC KIDNEY DISEASE AND HYPERTENSION (MULTI): ICD-10-CM

## 2024-09-11 DIAGNOSIS — E78.00 HYPERCHOLESTEROLEMIA: Primary | ICD-10-CM

## 2024-09-11 PROCEDURE — 3074F SYST BP LT 130 MM HG: CPT

## 2024-09-11 PROCEDURE — 99214 OFFICE O/P EST MOD 30 MIN: CPT

## 2024-09-11 PROCEDURE — G2211 COMPLEX E/M VISIT ADD ON: HCPCS

## 2024-09-11 PROCEDURE — 4010F ACE/ARB THERAPY RXD/TAKEN: CPT

## 2024-09-11 PROCEDURE — 1159F MED LIST DOCD IN RCRD: CPT

## 2024-09-11 PROCEDURE — 3008F BODY MASS INDEX DOCD: CPT

## 2024-09-11 PROCEDURE — 1123F ACP DISCUSS/DSCN MKR DOCD: CPT

## 2024-09-11 PROCEDURE — 1160F RVW MEDS BY RX/DR IN RCRD: CPT

## 2024-09-11 PROCEDURE — 3078F DIAST BP <80 MM HG: CPT

## 2024-09-11 PROCEDURE — 3044F HG A1C LEVEL LT 7.0%: CPT

## 2024-09-11 PROCEDURE — 3061F NEG MICROALBUMINURIA REV: CPT

## 2024-09-11 PROCEDURE — 1036F TOBACCO NON-USER: CPT

## 2024-09-11 ASSESSMENT — LIFESTYLE VARIABLES: HOW MANY STANDARD DRINKS CONTAINING ALCOHOL DO YOU HAVE ON A TYPICAL DAY: PATIENT DOES NOT DRINK

## 2024-09-11 NOTE — PROGRESS NOTES
"Subjective   Patient ID: Renee Candelario is a 74 y.o. female who presents for Follow-up (Patient declined flu shot).  HPI  74 year old female with PMH of T2DM, HTN, HLD, CKD stage 4, presents for follow up.      DM2: glipizide XL 10mg, farxiga 10mg, and Mounjaro 7.5mg weekly. Uses CGM.   HTN: losartan 50mg, metoprolol 50mg  HLD: Atorvastatin 80mg daily  CKD: stage four, GFR 27, needs to establish care with nephrology.   Asthma: Advair diskus BID, albuterol PRN. Usually needing inhaler <2x weekly.      Still has not scheduled with nephrology, needs to do so.   Has optho appointment scheduled later this month.     All systems have been reviewed and are negative for complaint other than those mentioned in the HPI.     Objective   /70 (BP Location: Left arm, Patient Position: Sitting, BP Cuff Size: Large adult)   Ht 1.651 m (5' 5\")   Wt 118 kg (261 lb)   BMI 43.43 kg/m²    Physical Exam  Constitutional:       General: She is awake.      Appearance: Normal appearance.   HENT:      Head: Normocephalic and atraumatic.   Eyes:      Extraocular Movements: Extraocular movements intact.      Pupils: Pupils are equal, round, and reactive to light.   Cardiovascular:      Rate and Rhythm: Normal rate and regular rhythm.      Heart sounds: S1 normal and S2 normal. No murmur heard.  Pulmonary:      Effort: Pulmonary effort is normal.      Breath sounds: Normal breath sounds.   Musculoskeletal:      Cervical back: Normal range of motion and neck supple.      Right lower leg: No edema.      Left lower leg: No edema.   Skin:     General: Skin is warm and dry.   Neurological:      General: No focal deficit present.      Mental Status: She is alert and oriented to person, place, and time.   Psychiatric:         Mood and Affect: Mood and affect normal.         Behavior: Behavior normal. Behavior is cooperative.         Thought Content: Thought content normal.         Judgment: Judgment normal.     Renee was seen today for " follow-up.  Diagnoses and all orders for this visit:  Hypercholesterolemia (Primary)  -     Due for lipid recheck, ordered  - Lipid Panel; Future  Essential hypertension  -    Stable. Continue current management.    -  Comprehensive Metabolic Panel; Future  -     Albumin-Creatinine Ratio, Urine Random; Future  Type 2 diabetes mellitus with stage 4 chronic kidney disease and hypertension (Multi)  -     A1C due in 4 days  - Recheck A1C. Last A1C 6.8. Patient tolerating current medication well. Having occasional lows at night. If A1C at goal, will plan to increase Mounjaro and decrease glipizide for additional weight benefit.   - Hemoglobin A1C; Future    Follow up in 3 months

## 2024-09-12 ENCOUNTER — PHARMACY VISIT (OUTPATIENT)
Dept: PHARMACY | Facility: CLINIC | Age: 74
End: 2024-09-12
Payer: COMMERCIAL

## 2024-09-13 PROCEDURE — RXMED WILLOW AMBULATORY MEDICATION CHARGE

## 2024-09-16 DIAGNOSIS — J45.40 MODERATE PERSISTENT ASTHMA WITHOUT COMPLICATION (HHS-HCC): ICD-10-CM

## 2024-09-16 DIAGNOSIS — J44.89 ASTHMA WITH CHRONIC OBSTRUCTIVE PULMONARY DISEASE (COPD) (MULTI): ICD-10-CM

## 2024-09-16 RX ORDER — FLUTICASONE PROPIONATE AND SALMETEROL 250; 50 UG/1; UG/1
1 POWDER RESPIRATORY (INHALATION)
Qty: 180 EACH | Refills: 1 | Status: SHIPPED | OUTPATIENT
Start: 2024-09-16

## 2024-09-17 ENCOUNTER — PHARMACY VISIT (OUTPATIENT)
Dept: PHARMACY | Facility: CLINIC | Age: 74
End: 2024-09-17
Payer: COMMERCIAL

## 2024-09-19 ENCOUNTER — LAB (OUTPATIENT)
Dept: LAB | Facility: LAB | Age: 74
End: 2024-09-19
Payer: MEDICARE

## 2024-09-19 DIAGNOSIS — E78.00 HYPERCHOLESTEROLEMIA: ICD-10-CM

## 2024-09-19 DIAGNOSIS — I12.9 TYPE 2 DIABETES MELLITUS WITH STAGE 4 CHRONIC KIDNEY DISEASE AND HYPERTENSION (MULTI): ICD-10-CM

## 2024-09-19 DIAGNOSIS — N18.4 TYPE 2 DIABETES MELLITUS WITH STAGE 4 CHRONIC KIDNEY DISEASE AND HYPERTENSION (MULTI): ICD-10-CM

## 2024-09-19 DIAGNOSIS — E11.22 TYPE 2 DIABETES MELLITUS WITH STAGE 4 CHRONIC KIDNEY DISEASE AND HYPERTENSION (MULTI): ICD-10-CM

## 2024-09-19 DIAGNOSIS — I10 ESSENTIAL HYPERTENSION: ICD-10-CM

## 2024-09-19 LAB
ALBUMIN SERPL BCP-MCNC: 4 G/DL (ref 3.4–5)
ALP SERPL-CCNC: 107 U/L (ref 33–136)
ALT SERPL W P-5'-P-CCNC: 10 U/L (ref 7–45)
ANION GAP SERPL CALC-SCNC: 10 MMOL/L (ref 10–20)
AST SERPL W P-5'-P-CCNC: 12 U/L (ref 9–39)
BILIRUB SERPL-MCNC: 0.4 MG/DL (ref 0–1.2)
BUN SERPL-MCNC: 25 MG/DL (ref 6–23)
CALCIUM SERPL-MCNC: 9 MG/DL (ref 8.6–10.6)
CHLORIDE SERPL-SCNC: 109 MMOL/L (ref 98–107)
CHOLEST SERPL-MCNC: 151 MG/DL (ref 0–199)
CHOLESTEROL/HDL RATIO: 3.4
CO2 SERPL-SCNC: 26 MMOL/L (ref 21–32)
CREAT SERPL-MCNC: 1.73 MG/DL (ref 0.5–1.05)
CREAT UR-MCNC: 108.3 MG/DL (ref 20–320)
EGFRCR SERPLBLD CKD-EPI 2021: 31 ML/MIN/1.73M*2
EST. AVERAGE GLUCOSE BLD GHB EST-MCNC: 157 MG/DL
GLUCOSE SERPL-MCNC: 158 MG/DL (ref 74–99)
HBA1C MFR BLD: 7.1 %
HDLC SERPL-MCNC: 44.5 MG/DL
LDLC SERPL CALC-MCNC: 79 MG/DL
MICROALBUMIN UR-MCNC: 18.2 MG/L
MICROALBUMIN/CREAT UR: 16.8 UG/MG CREAT
NON HDL CHOLESTEROL: 107 MG/DL (ref 0–149)
POTASSIUM SERPL-SCNC: 4.2 MMOL/L (ref 3.5–5.3)
PROT SERPL-MCNC: 6.6 G/DL (ref 6.4–8.2)
SODIUM SERPL-SCNC: 141 MMOL/L (ref 136–145)
TRIGL SERPL-MCNC: 137 MG/DL (ref 0–149)
VLDL: 27 MG/DL (ref 0–40)

## 2024-09-19 PROCEDURE — 83036 HEMOGLOBIN GLYCOSYLATED A1C: CPT

## 2024-09-19 PROCEDURE — 80053 COMPREHEN METABOLIC PANEL: CPT

## 2024-09-19 PROCEDURE — 80061 LIPID PANEL: CPT

## 2024-09-19 PROCEDURE — 82570 ASSAY OF URINE CREATININE: CPT

## 2024-09-19 PROCEDURE — 82043 UR ALBUMIN QUANTITATIVE: CPT

## 2024-09-19 PROCEDURE — 36415 COLL VENOUS BLD VENIPUNCTURE: CPT

## 2024-09-20 DIAGNOSIS — E11.22 TYPE 2 DIABETES MELLITUS WITH STAGE 4 CHRONIC KIDNEY DISEASE AND HYPERTENSION (MULTI): Primary | ICD-10-CM

## 2024-09-20 DIAGNOSIS — I12.9 TYPE 2 DIABETES MELLITUS WITH STAGE 4 CHRONIC KIDNEY DISEASE AND HYPERTENSION (MULTI): Primary | ICD-10-CM

## 2024-09-20 DIAGNOSIS — N18.4 TYPE 2 DIABETES MELLITUS WITH STAGE 4 CHRONIC KIDNEY DISEASE AND HYPERTENSION (MULTI): Primary | ICD-10-CM

## 2024-09-20 RX ORDER — TIRZEPATIDE 10 MG/.5ML
10 INJECTION, SOLUTION SUBCUTANEOUS WEEKLY
Qty: 2 ML | Refills: 2 | Status: SHIPPED | OUTPATIENT
Start: 2024-09-20

## 2024-09-20 RX ORDER — GLIPIZIDE 5 MG/1
5 TABLET, FILM COATED, EXTENDED RELEASE ORAL DAILY
Qty: 90 TABLET | Refills: 0 | Status: SHIPPED | OUTPATIENT
Start: 2024-09-20 | End: 2024-12-19

## 2024-09-24 ENCOUNTER — APPOINTMENT (OUTPATIENT)
Dept: PHARMACY | Facility: HOSPITAL | Age: 74
End: 2024-09-24
Payer: MEDICARE

## 2024-09-24 DIAGNOSIS — J45.40 MODERATE PERSISTENT ASTHMA WITHOUT COMPLICATION (HHS-HCC): ICD-10-CM

## 2024-09-24 DIAGNOSIS — E11.22 TYPE 2 DIABETES MELLITUS WITH STAGE 4 CHRONIC KIDNEY DISEASE AND HYPERTENSION (MULTI): ICD-10-CM

## 2024-09-24 DIAGNOSIS — J44.89 ASTHMA WITH CHRONIC OBSTRUCTIVE PULMONARY DISEASE (COPD) (MULTI): ICD-10-CM

## 2024-09-24 DIAGNOSIS — N18.4 TYPE 2 DIABETES MELLITUS WITH STAGE 4 CHRONIC KIDNEY DISEASE AND HYPERTENSION (MULTI): ICD-10-CM

## 2024-09-24 DIAGNOSIS — I12.9 TYPE 2 DIABETES MELLITUS WITH STAGE 4 CHRONIC KIDNEY DISEASE AND HYPERTENSION (MULTI): ICD-10-CM

## 2024-09-24 PROCEDURE — RXMED WILLOW AMBULATORY MEDICATION CHARGE

## 2024-09-24 RX ORDER — FLUTICASONE PROPIONATE AND SALMETEROL 250; 50 UG/1; UG/1
1 POWDER RESPIRATORY (INHALATION)
Qty: 180 EACH | Refills: 3 | Status: SHIPPED | OUTPATIENT
Start: 2024-09-24

## 2024-09-24 RX ORDER — TIRZEPATIDE 10 MG/.5ML
10 INJECTION, SOLUTION SUBCUTANEOUS WEEKLY
Qty: 2 ML | Refills: 2 | Status: SHIPPED | OUTPATIENT
Start: 2024-09-24

## 2024-09-24 NOTE — PROGRESS NOTES
Subjective   Patient ID: Renee Candelario is a 74 y.o. female who presents for Asthma, Diabetes, and COPD    Referring Provider: MADELIN Najera     Diabetes  She presents for her follow-up diabetic visit. She has type 2 diabetes mellitus. Her disease course has been improving. Compliance with diabetes treatment: Farxiga 10 mg, glipizide 5 mg daily, Mounjaro 10 mg weekly.     Morning sugars between 120-140s  Reported occasional hypoglycemic events in the middle of the night, states it is infrequent though and easily corrected by orange juice--> medications adjusted at last PCP appointment, increased Mounjaro and decreased glipizide    Diet:   Breakfast: eggs occasionally, umanzor, grits, oatmeal, toast  Lunch/Dinner: Meat, chicken, vegetables  Drinks: water, zero sugar soda    Asthma/COPD  She reports good control of symptoms on maintenance Advair  Aggravating Factors: hot temperatures  DEMETRIS use: < or = 2 days/week  Reports significant cost barrier to Advair     Objective     There were no vitals taken for this visit.     Labs  Lab Results   Component Value Date    BILITOT 0.4 09/19/2024    CALCIUM 9.0 09/19/2024    CO2 26 09/19/2024     (H) 09/19/2024    CREATININE 1.73 (H) 09/19/2024    GLUCOSE 158 (H) 09/19/2024    ALKPHOS 107 09/19/2024    K 4.2 09/19/2024    PROT 6.6 09/19/2024     09/19/2024    AST 12 09/19/2024    ALT 10 09/19/2024    BUN 25 (H) 09/19/2024    ANIONGAP 10 09/19/2024    PHOS 5.0 (H) 03/21/2024    ALBUMIN 4.0 09/19/2024    LIPASE 36 07/01/2019    GFRF 36 (A) 07/31/2023     Lab Results   Component Value Date    TRIG 137 09/19/2024    CHOL 151 09/19/2024    LDLCALC 79 09/19/2024    HDL 44.5 09/19/2024     Lab Results   Component Value Date    HGBA1C 7.1 (H) 09/19/2024       Current Outpatient Medications on File Prior to Visit   Medication Sig Dispense Refill    albuterol 90 mcg/actuation inhaler Inhale 2 puffs every 6 hours if needed for wheezing or shortness of breath. 18 g 2     aspirin 81 mg EC tablet Take 1 tablet (81 mg) by mouth once daily. 90 tablet 1    atorvastatin (Lipitor) 80 mg tablet Take 1 tablet (80 mg) by mouth once daily. 90 tablet 1    dapagliflozin propanediol (Farxiga) 10 mg Take 1 tablet (10 mg) by mouth once daily. 30 tablet 11    fluticasone propion-salmeteroL (Advair Diskus) 250-50 mcg/dose diskus inhaler Inhale 1 puff 2 times a day. 180 each 1    FreeStyle Scarlet 3 [place] misc Use as instructed 1 each 0    FreeStyle Scarlet 3 Sensor device Use for 14 days. 2 each 2    glipiZIDE XL (Glucotrol XL) 5 mg 24 hr tablet Take 1 tablet (5 mg) by mouth once daily. Do not crush, chew, or split. 90 tablet 0    losartan (Cozaar) 50 mg tablet Take 1 tablet (50 mg) by mouth once daily. 90 tablet 0    metoprolol succinate XL (Toprol-XL) 50 mg 24 hr tablet Take 1 tablet (50 mg) by mouth once daily. 90 tablet 1    multivit-min/ferrous fumarate (MULTI VITAMIN ORAL) Take 1 tablet by mouth once daily.      [DISCONTINUED] glipiZIDE XL (Glucotrol XL) 10 mg 24 hr tablet Take 1 tablet (10 mg) by mouth once daily with breakfast. 90 tablet 1    [DISCONTINUED] tirzepatide (Mounjaro) 10 mg/0.5 mL pen injector Inject 10 mg under the skin 1 (one) time per week. 2 mL 2    [DISCONTINUED] tirzepatide (Mounjaro) 7.5 mg/0.5 mL pen injector Inject 7.5 mg under the skin 1 (one) time per week. 2 mL 1     No current facility-administered medications on file prior to visit.        Assessment/Plan   Problem List Items Addressed This Visit       Moderate persistent asthma without complication ([facility])    Relevant Orders    Follow Up In Clinical Pharmacy    Follow Up In Clinical Pharmacy    Type 2 diabetes mellitus with stage 4 chronic kidney disease and hypertension (Multi)    Relevant Medications    tirzepatide (Mounjaro) 10 mg/0.5 mL pen injector    Other Relevant Orders    Follow Up In Clinical Pharmacy    Follow Up In Clinical Pharmacy    Asthma with chronic obstructive pulmonary disease (COPD) (Multi)     Relevant Orders    Follow Up In Clinical Pharmacy    Follow Up In Clinical Pharmacy         Plan: DM  Patient's A1c is stable, with last reading of 7.1% (goal <7%).  At last PCP appointment 9/11/24, dose increase to Mounjaro 10 mg weekly and decrease glipizide to reduce risk of hypoglycemia.   INCREASE Mounjaro 10 mg weekly; as instructed by PCP  [facility] refill to  [facility] for [facility] patient, [facility]  DECREASE glipizide XL to 5 mg once daily with meal  Prescription sent by PCP, ready at [facility]  No refills needed at this time    Plan: Asthma/COPD  Patient reports continued control with Advair maintenance inhaler, requiring rescue albuterol infrequently  CONTINUE Advair   Sent prescription to  [facility] for mail to patient for [facility].     PAP renewal near due (through 11/2/24); patient sent financial documents via fax after last visit; submitted to support team representative    Pharmacy Follow Up: 11/12/24 @ 1030 am  PCP Follow Up: 12/11/2024    Osiris ScanlonD  Clinical Pharmacy Specialist, Primary Care   [phone]    Continue all meds under the continuation of care with the referring provider and clinical pharmacy team.

## 2024-09-25 ENCOUNTER — PHARMACY VISIT (OUTPATIENT)
Dept: PHARMACY | Facility: CLINIC | Age: 74
End: 2024-09-25
Payer: COMMERCIAL

## 2024-09-25 ENCOUNTER — APPOINTMENT (OUTPATIENT)
Dept: OPHTHALMOLOGY | Facility: CLINIC | Age: 74
End: 2024-09-25
Payer: MEDICARE

## 2024-09-25 DIAGNOSIS — E11.3293 TYPE 2 DIABETES MELLITUS WITH BOTH EYES AFFECTED BY MILD NONPROLIFERATIVE RETINOPATHY WITHOUT MACULAR EDEMA, WITHOUT LONG-TERM CURRENT USE OF INSULIN: ICD-10-CM

## 2024-09-25 DIAGNOSIS — H52.4 PRESBYOPIA: ICD-10-CM

## 2024-09-25 DIAGNOSIS — H52.223 REGULAR ASTIGMATISM OF BOTH EYES: ICD-10-CM

## 2024-09-25 DIAGNOSIS — H25.813 COMBINED FORM OF AGE-RELATED CATARACT, BOTH EYES: Primary | ICD-10-CM

## 2024-09-25 DIAGNOSIS — H52.03 HYPEROPIA OF BOTH EYES: ICD-10-CM

## 2024-09-25 PROCEDURE — 92134 CPTRZ OPH DX IMG PST SGM RTA: CPT | Performed by: OPHTHALMOLOGY

## 2024-09-25 PROCEDURE — 92004 COMPRE OPH EXAM NEW PT 1/>: CPT | Performed by: OPHTHALMOLOGY

## 2024-09-25 PROCEDURE — 92015 DETERMINE REFRACTIVE STATE: CPT | Performed by: OPHTHALMOLOGY

## 2024-09-25 ASSESSMENT — REFRACTION_MANIFEST
OD_CYLINDER: -0.75
OS_AXIS: 095
METHOD_AUTOREFRACTION: 1
OS_CYLINDER: -0.75
OD_ADD: +2.75
OS_ADD: +2.75
OD_AXIS: 060
OD_AXIS: 060
OD_SPHERE: +1.25
OS_AXIS: 095
OS_SPHERE: +1.50

## 2024-09-25 ASSESSMENT — EXTERNAL EXAM - LEFT EYE: OS_EXAM: NORMAL

## 2024-09-25 ASSESSMENT — VISUAL ACUITY
OD_SC: 20/25
METHOD: SNELLEN - LINEAR
OS_SC: 20/40
OS_PH_SC: 20/25

## 2024-09-25 ASSESSMENT — TONOMETRY
IOP_METHOD: GOLDMANN APPLANATION
OD_IOP_MMHG: 16
OS_IOP_MMHG: 17

## 2024-09-25 ASSESSMENT — EXTERNAL EXAM - RIGHT EYE: OD_EXAM: NORMAL

## 2024-09-25 ASSESSMENT — CUP TO DISC RATIO
OD_RATIO: 0.35
OS_RATIO: 0.35

## 2024-09-25 ASSESSMENT — SLIT LAMP EXAM - LIDS
COMMENTS: NORMAL
COMMENTS: NORMAL

## 2024-09-25 ASSESSMENT — ENCOUNTER SYMPTOMS: EYES NEGATIVE: 1

## 2024-09-25 NOTE — PROGRESS NOTES
Assessment/Plan   Diagnoses and all orders for this visit:  Combined form of age-related cataract, both eyes  Not visually significant at the present time  continue to monitor    Type 2 diabetes mellitus with both eyes affected by mild nonproliferative retinopathy without macular edema, without long-term current use of insulin (Multi)  -mild non proliferative diabetic retinopathy  -pt was advised of the importance of good diabetes control and the importance of a yearly dilated diabetic exam    Hyperopia of both eyes  Regular astigmatism of both eyes  Presbyopia  Refractive error  -give Rx for new glasses    Return for a dilated exam in   12  months or sooner if having any problems

## 2024-10-02 PROCEDURE — RXMED WILLOW AMBULATORY MEDICATION CHARGE

## 2024-10-03 ENCOUNTER — PHARMACY VISIT (OUTPATIENT)
Dept: PHARMACY | Facility: CLINIC | Age: 74
End: 2024-10-03
Payer: COMMERCIAL

## 2024-11-01 DIAGNOSIS — I12.9 TYPE 2 DIABETES MELLITUS WITH STAGE 4 CHRONIC KIDNEY DISEASE AND HYPERTENSION (MULTI): ICD-10-CM

## 2024-11-01 DIAGNOSIS — E11.22 TYPE 2 DIABETES MELLITUS WITH STAGE 4 CHRONIC KIDNEY DISEASE AND HYPERTENSION (MULTI): ICD-10-CM

## 2024-11-01 DIAGNOSIS — N18.4 TYPE 2 DIABETES MELLITUS WITH STAGE 4 CHRONIC KIDNEY DISEASE AND HYPERTENSION (MULTI): ICD-10-CM

## 2024-11-01 RX ORDER — HYDROCHLOROTHIAZIDE 12.5 MG/1
1 CAPSULE ORAL
Qty: 2 EACH | Refills: 2 | Status: SHIPPED | OUTPATIENT
Start: 2024-11-01

## 2024-11-01 RX ORDER — HYDROCHLOROTHIAZIDE 12.5 MG/1
1 CAPSULE ORAL
COMMUNITY
End: 2024-11-01 | Stop reason: SDUPTHER

## 2024-11-06 PROCEDURE — RXMED WILLOW AMBULATORY MEDICATION CHARGE

## 2024-11-07 ENCOUNTER — PHARMACY VISIT (OUTPATIENT)
Dept: PHARMACY | Facility: CLINIC | Age: 74
End: 2024-11-07
Payer: COMMERCIAL

## 2024-11-12 ENCOUNTER — APPOINTMENT (OUTPATIENT)
Dept: PHARMACY | Facility: HOSPITAL | Age: 74
End: 2024-11-12
Payer: MEDICARE

## 2024-11-12 DIAGNOSIS — I12.9 TYPE 2 DIABETES MELLITUS WITH STAGE 4 CHRONIC KIDNEY DISEASE AND HYPERTENSION (MULTI): ICD-10-CM

## 2024-11-12 DIAGNOSIS — J44.89 ASTHMA WITH CHRONIC OBSTRUCTIVE PULMONARY DISEASE (COPD) (MULTI): ICD-10-CM

## 2024-11-12 DIAGNOSIS — N18.4 TYPE 2 DIABETES MELLITUS WITH STAGE 4 CHRONIC KIDNEY DISEASE AND HYPERTENSION (MULTI): ICD-10-CM

## 2024-11-12 DIAGNOSIS — J45.40 MODERATE PERSISTENT ASTHMA WITHOUT COMPLICATION (HHS-HCC): ICD-10-CM

## 2024-11-12 DIAGNOSIS — E11.22 TYPE 2 DIABETES MELLITUS WITH STAGE 4 CHRONIC KIDNEY DISEASE AND HYPERTENSION (MULTI): ICD-10-CM

## 2024-11-12 NOTE — PROGRESS NOTES
Subjective   Patient ID: Renee Candelario is a 74 y.o. female who presents for Diabetes, COPD, and Asthma    Referring Provider: MADELIN Najera     On [facility] for Mounjaro, Farxiga, and Advair, approved through 9/24/25.     Diabetes  She presents for her follow-up diabetic visit. She has type 2 diabetes mellitus. Her disease course has been improving. Compliance with diabetes treatment: Farxiga 10 mg, glipizide 5 mg daily, Mounjaro 10 mg weekly.     Morning sugars between 120-140s  Denies any hypoglycemic events.     Diet:   Breakfast: eggs occasionally, umanzor, grits, oatmeal, toast  Lunch/Dinner: Meat, chicken, vegetables  Drinks: water, zero sugar soda    Asthma/COPD  She reports good control of symptoms on maintenance Advair  Aggravating Factors: hot temperatures  DEMETRIS use: < or = 2 days/week  Reports significant cost barrier to Advair     Objective     There were no vitals taken for this visit.     Labs  Lab Results   Component Value Date    BILITOT 0.4 09/19/2024    CALCIUM 9.0 09/19/2024    CO2 26 09/19/2024     (H) 09/19/2024    CREATININE 1.73 (H) 09/19/2024    GLUCOSE 158 (H) 09/19/2024    ALKPHOS 107 09/19/2024    K 4.2 09/19/2024    PROT 6.6 09/19/2024     09/19/2024    AST 12 09/19/2024    ALT 10 09/19/2024    BUN 25 (H) 09/19/2024    ANIONGAP 10 09/19/2024    PHOS 5.0 (H) 03/21/2024    ALBUMIN 4.0 09/19/2024    LIPASE 36 07/01/2019    GFRF 36 (A) 07/31/2023     Lab Results   Component Value Date    TRIG 137 09/19/2024    CHOL 151 09/19/2024    LDLCALC 79 09/19/2024    HDL 44.5 09/19/2024     Lab Results   Component Value Date    HGBA1C 7.1 (H) 09/19/2024       Current Outpatient Medications on File Prior to Visit   Medication Sig Dispense Refill    albuterol 90 mcg/actuation inhaler Inhale 2 puffs every 6 hours if needed for wheezing or shortness of breath. 18 g 2    aspirin 81 mg EC tablet Take 1 tablet (81 mg) by mouth once daily. 90 tablet 1    atorvastatin (Lipitor) 80 mg tablet  Take 1 tablet (80 mg) by mouth once daily. 90 tablet 1    blood-glucose sensor (FreeStyle Scarlet 3 Plus Sensor) device 1 each every 14 (fourteen) days. 2 each 2    dapagliflozin propanediol (Farxiga) 10 mg Take 1 tablet (10 mg) by mouth once daily. 30 tablet 11    fluticasone propion-salmeteroL (Advair Diskus) 250-50 mcg/dose diskus inhaler Inhale 1 puff 2 times a day. 180 each 3    FreeStyle Scarlet 3 [place] misc Use as instructed 1 each 0    FreeStyle Scarlet 3 Sensor device Use for 14 days. 2 each 2    glipiZIDE XL (Glucotrol XL) 5 mg 24 hr tablet Take 1 tablet (5 mg) by mouth once daily. Do not crush, chew, or split. 90 tablet 0    losartan (Cozaar) 50 mg tablet Take 1 tablet (50 mg) by mouth once daily. 90 tablet 0    metoprolol succinate XL (Toprol-XL) 50 mg 24 hr tablet Take 1 tablet (50 mg) by mouth once daily. 90 tablet 1    multivit-min/ferrous fumarate (MULTI VITAMIN ORAL) Take 1 tablet by mouth once daily.      tirzepatide (Mounjaro) 10 mg/0.5 mL pen injector Inject 10 mg under the skin 1 (one) time per week. 2 mL 2     No current facility-administered medications on file prior to visit.        Assessment/Plan   Problem List Items Addressed This Visit       Moderate persistent asthma without complication ([facility])    Relevant Orders    Referral to Clinical Pharmacy    Type 2 diabetes mellitus with stage 4 chronic kidney disease and hypertension (Multi)    Relevant Orders    Referral to Clinical Pharmacy    Asthma with chronic obstructive pulmonary disease (COPD) (Multi)    Relevant Orders    Referral to Clinical Pharmacy         Plan: DM  Patient's A1c is stable, with last reading of 7.1% (goal <7%).  At last PCP appointment 9/11/24, dose increase to Mounjaro 10 mg weekly and decrease glipizide to reduce risk of hypoglycemia.  Patient has been tolerating 10 mg Mounjaro, reports occasional GI upset.   CONTINUE Mounjaro 10 mg weekly, glipizide XL 5 mg once daily with meal, Farxiga 10 mg daily  Patient reports  she is not feeling well and that she has a virus, encouraged adequate hydration and replenishment of electrolytes  CONTINUE monitoring blood glucose with Freestyle Scarlet 3 Plus     Plan: Asthma/COPD  Patient reports continued control with Advair maintenance inhaler, requiring rescue albuterol infrequently  CONTINUE Advair and albuterol inhaler as needed  No refills needed at this time     Pharmacy Follow Up: 12/10/24 @ 1200 pm  PCP Follow Up: 12/11/2024    Osiris ScanlonD  Clinical Pharmacy Specialist, Primary Care   [phone]    Continue all meds under the continuation of care with the referring provider and clinical pharmacy team.

## 2024-11-29 DIAGNOSIS — I12.9 TYPE 2 DIABETES MELLITUS WITH STAGE 4 CHRONIC KIDNEY DISEASE AND HYPERTENSION (MULTI): ICD-10-CM

## 2024-11-29 DIAGNOSIS — E11.22 TYPE 2 DIABETES MELLITUS WITH STAGE 4 CHRONIC KIDNEY DISEASE AND HYPERTENSION (MULTI): ICD-10-CM

## 2024-11-29 DIAGNOSIS — N18.4 TYPE 2 DIABETES MELLITUS WITH STAGE 4 CHRONIC KIDNEY DISEASE AND HYPERTENSION (MULTI): ICD-10-CM

## 2024-11-29 DIAGNOSIS — I10 ESSENTIAL HYPERTENSION: ICD-10-CM

## 2024-11-29 DIAGNOSIS — E78.00 HYPERCHOLESTEROLEMIA: ICD-10-CM

## 2024-11-29 PROCEDURE — RXMED WILLOW AMBULATORY MEDICATION CHARGE

## 2024-12-02 RX ORDER — ATORVASTATIN CALCIUM 80 MG/1
80 TABLET, FILM COATED ORAL DAILY
Qty: 90 TABLET | Refills: 0 | Status: SHIPPED | OUTPATIENT
Start: 2024-12-02

## 2024-12-02 RX ORDER — METOPROLOL SUCCINATE 50 MG/1
50 TABLET, EXTENDED RELEASE ORAL DAILY
Qty: 90 TABLET | Refills: 0 | Status: SHIPPED | OUTPATIENT
Start: 2024-12-02

## 2024-12-02 RX ORDER — LOSARTAN POTASSIUM 50 MG/1
50 TABLET ORAL DAILY
Qty: 90 TABLET | Refills: 0 | Status: SHIPPED | OUTPATIENT
Start: 2024-12-02

## 2024-12-02 RX ORDER — GLIPIZIDE 5 MG/1
TABLET, FILM COATED, EXTENDED RELEASE ORAL
Qty: 90 TABLET | Refills: 0 | Status: SHIPPED | OUTPATIENT
Start: 2024-12-02

## 2024-12-03 ENCOUNTER — PHARMACY VISIT (OUTPATIENT)
Dept: PHARMACY | Facility: CLINIC | Age: 74
End: 2024-12-03
Payer: COMMERCIAL

## 2024-12-10 ENCOUNTER — APPOINTMENT (OUTPATIENT)
Dept: PHARMACY | Facility: HOSPITAL | Age: 74
End: 2024-12-10
Payer: MEDICARE

## 2024-12-10 DIAGNOSIS — J45.40 MODERATE PERSISTENT ASTHMA WITHOUT COMPLICATION (HHS-HCC): ICD-10-CM

## 2024-12-10 DIAGNOSIS — E11.22 TYPE 2 DIABETES MELLITUS WITH STAGE 4 CHRONIC KIDNEY DISEASE AND HYPERTENSION (MULTI): ICD-10-CM

## 2024-12-10 DIAGNOSIS — N18.4 TYPE 2 DIABETES MELLITUS WITH STAGE 4 CHRONIC KIDNEY DISEASE AND HYPERTENSION (MULTI): ICD-10-CM

## 2024-12-10 DIAGNOSIS — J44.89 ASTHMA WITH CHRONIC OBSTRUCTIVE PULMONARY DISEASE (COPD) (MULTI): ICD-10-CM

## 2024-12-10 DIAGNOSIS — I12.9 TYPE 2 DIABETES MELLITUS WITH STAGE 4 CHRONIC KIDNEY DISEASE AND HYPERTENSION (MULTI): ICD-10-CM

## 2024-12-10 RX ORDER — TIRZEPATIDE 10 MG/.5ML
10 INJECTION, SOLUTION SUBCUTANEOUS WEEKLY
Qty: 2 ML | Refills: 0 | Status: SHIPPED | OUTPATIENT
Start: 2024-12-10 | End: 2024-12-11 | Stop reason: DRUGHIGH

## 2024-12-10 NOTE — PROGRESS NOTES
Subjective   Patient ID: Renee Candelario is a 74 y.o. female who presents for Diabetes    Referring Provider: MADELIN Najera     On  [facility] for Mounjaro, Farxiga, and Advair, approved through 9/24/25.     Diabetes  She presents for her follow-up diabetic visit. She has type 2 diabetes mellitus. Her disease course has been improving. Compliance with diabetes treatment: Farxiga 10 mg, glipizide 5 mg daily, Mounjaro 10 mg weekly.     Morning sugars between 109,120,200,130, 79, 80 mg/dL   Hypoglycemia 2x 59 and 61 mg/dL between 3-4am. Fixed with juice.     Diet:   Breakfast: eggs occasionally, umanzor, grits, oatmeal, toast  Lunch/Dinner: Meat, chicken, vegetables  Drinks: water, zero sugar soda    Asthma/COPD  She reports good control of symptoms on maintenance Advair  Aggravating Factors: hot temperatures  DEMETRIS use: < or = 2 days/week  Reports significant cost barrier to Advair     Objective     There were no vitals taken for this visit.     Labs  Lab Results   Component Value Date    BILITOT 0.4 09/19/2024    CALCIUM 9.0 09/19/2024    CO2 26 09/19/2024     (H) 09/19/2024    CREATININE 1.73 (H) 09/19/2024    GLUCOSE 158 (H) 09/19/2024    ALKPHOS 107 09/19/2024    K 4.2 09/19/2024    PROT 6.6 09/19/2024     09/19/2024    AST 12 09/19/2024    ALT 10 09/19/2024    BUN 25 (H) 09/19/2024    ANIONGAP 10 09/19/2024    PHOS 5.0 (H) 03/21/2024    ALBUMIN 4.0 09/19/2024    LIPASE 36 07/01/2019    GFRF 36 (A) 07/31/2023     Lab Results   Component Value Date    TRIG 137 09/19/2024    CHOL 151 09/19/2024    LDLCALC 79 09/19/2024    HDL 44.5 09/19/2024     Lab Results   Component Value Date    HGBA1C 7.1 (H) 09/19/2024       Current Outpatient Medications on File Prior to Visit   Medication Sig Dispense Refill    atorvastatin (Lipitor) 80 mg tablet TAKE 1 TABLET(80 MG) BY MOUTH DAILY 90 tablet 0    glipiZIDE XL (Glucotrol XL) 5 mg 24 hr tablet TAKE 1 TABLET(5 MG) BY MOUTH DAILY. DO NOT CRUSH, CHEW, OR SPLIT 90  tablet 0    losartan (Cozaar) 50 mg tablet TAKE 1 TABLET(50 MG) BY MOUTH DAILY 90 tablet 0    metoprolol succinate XL (Toprol-XL) 50 mg 24 hr tablet TAKE 1 TABLET(50 MG) BY MOUTH DAILY 90 tablet 0    albuterol 90 mcg/actuation inhaler Inhale 2 puffs every 6 hours if needed for wheezing or shortness of breath. 18 g 2    aspirin 81 mg EC tablet Take 1 tablet (81 mg) by mouth once daily. 90 tablet 1    blood-glucose sensor (FreeStyle Scarlet 3 Plus Sensor) device 1 each every 14 (fourteen) days. 2 each 2    dapagliflozin propanediol (Farxiga) 10 mg Take 1 tablet (10 mg) by mouth once daily. 30 tablet 11    fluticasone propion-salmeteroL (Advair Diskus) 250-50 mcg/dose diskus inhaler Inhale 1 puff 2 times a day. 180 each 3    FreeStyle Scarlet 3 [place] misc Use as instructed 1 each 0    FreeStyle Scarlet 3 Sensor device Use for 14 days. 2 each 2    multivit-min/ferrous fumarate (MULTI VITAMIN ORAL) Take 1 tablet by mouth once daily.      [DISCONTINUED] tirzepatide (Mounjaro) 10 mg/0.5 mL pen injector Inject 10 mg under the skin 1 (one) time per week. 2 mL 2     No current facility-administered medications on file prior to visit.        Assessment/Plan   Problem List Items Addressed This Visit       Moderate persistent asthma without complication ([facility])    Relevant Orders    Referral to Clinical Pharmacy    Type 2 diabetes mellitus with stage 4 chronic kidney disease and hypertension (Multi)    Relevant Medications    tirzepatide (Mounjaro) 10 mg/0.5 mL pen injector    Other Relevant Orders    Referral to Clinical Pharmacy    Asthma with chronic obstructive pulmonary disease (COPD) (Multi)    Relevant Orders    Referral to Clinical Pharmacy   Plan: DM  Patient's A1c is stable, with last reading of 7.1% (goal <7%).  At last PCP appointment 9/11/24, Patient has been tolerating 10 mg Mounjaro. She has had a couple instances of low BG since the last visit. Educated on how glipizide can increase the risk of low BG. She was  decreased to glipizide once daily at the last visit. Discussed how there is still room to increase Mounjaro and if that is done she could completely stop glipizide. She wishes to discuss this plan with PCP at tomorrows visit (12/11/24).   CONTINUE Mounjaro 10 mg weekly, glipizide XL 5 mg once daily with meal, Farxiga 10 mg daily  CONTINUE monitoring blood glucose with Freestyle Scarlet 3 Plus     Plan: Asthma/COPD  Patient reports continued control with Advair maintenance inhaler, requiring rescue albuterol infrequently  CONTINUE Advair and albuterol inhaler as needed  No refills needed at this time     Pharmacy Follow Up: 1/14/24 @ 1140  PCP Follow Up: 12/11/2024    Yary Lai, PharmD  Clinical Pharmacy Specialist, Primary Care   [phone]    Continue all meds under the continuation of care with the referring provider and clinical pharmacy team.

## 2024-12-11 ENCOUNTER — APPOINTMENT (OUTPATIENT)
Dept: PRIMARY CARE | Facility: CLINIC | Age: 74
End: 2024-12-11
Payer: MEDICARE

## 2024-12-11 VITALS
SYSTOLIC BLOOD PRESSURE: 138 MMHG | BODY MASS INDEX: 45.18 KG/M2 | DIASTOLIC BLOOD PRESSURE: 85 MMHG | WEIGHT: 255 LBS | HEIGHT: 63 IN

## 2024-12-11 DIAGNOSIS — N18.4 TYPE 2 DIABETES MELLITUS WITH STAGE 4 CHRONIC KIDNEY DISEASE AND HYPERTENSION (MULTI): Primary | ICD-10-CM

## 2024-12-11 DIAGNOSIS — I12.9 TYPE 2 DIABETES MELLITUS WITH STAGE 4 CHRONIC KIDNEY DISEASE AND HYPERTENSION (MULTI): Primary | ICD-10-CM

## 2024-12-11 DIAGNOSIS — E66.813 CLASS 3 SEVERE OBESITY DUE TO EXCESS CALORIES WITH SERIOUS COMORBIDITY AND BODY MASS INDEX (BMI) OF 40.0 TO 44.9 IN ADULT: ICD-10-CM

## 2024-12-11 DIAGNOSIS — E66.01 CLASS 3 SEVERE OBESITY DUE TO EXCESS CALORIES WITH SERIOUS COMORBIDITY AND BODY MASS INDEX (BMI) OF 40.0 TO 44.9 IN ADULT: ICD-10-CM

## 2024-12-11 DIAGNOSIS — E11.22 TYPE 2 DIABETES MELLITUS WITH STAGE 4 CHRONIC KIDNEY DISEASE AND HYPERTENSION (MULTI): Primary | ICD-10-CM

## 2024-12-11 DIAGNOSIS — I10 ESSENTIAL HYPERTENSION: ICD-10-CM

## 2024-12-11 DIAGNOSIS — J44.89 ASTHMA WITH CHRONIC OBSTRUCTIVE PULMONARY DISEASE (COPD) (MULTI): ICD-10-CM

## 2024-12-11 PROCEDURE — G2211 COMPLEX E/M VISIT ADD ON: HCPCS

## 2024-12-11 PROCEDURE — 1160F RVW MEDS BY RX/DR IN RCRD: CPT

## 2024-12-11 PROCEDURE — 1036F TOBACCO NON-USER: CPT

## 2024-12-11 PROCEDURE — 3048F LDL-C <100 MG/DL: CPT

## 2024-12-11 PROCEDURE — 3061F NEG MICROALBUMINURIA REV: CPT

## 2024-12-11 PROCEDURE — 3079F DIAST BP 80-89 MM HG: CPT

## 2024-12-11 PROCEDURE — 1123F ACP DISCUSS/DSCN MKR DOCD: CPT

## 2024-12-11 PROCEDURE — 3008F BODY MASS INDEX DOCD: CPT

## 2024-12-11 PROCEDURE — 1159F MED LIST DOCD IN RCRD: CPT

## 2024-12-11 PROCEDURE — 3051F HG A1C>EQUAL 7.0%<8.0%: CPT

## 2024-12-11 PROCEDURE — 3075F SYST BP GE 130 - 139MM HG: CPT

## 2024-12-11 PROCEDURE — 99214 OFFICE O/P EST MOD 30 MIN: CPT

## 2024-12-11 PROCEDURE — 4010F ACE/ARB THERAPY RXD/TAKEN: CPT

## 2024-12-11 RX ORDER — HYDROCHLOROTHIAZIDE 12.5 MG/1
1 CAPSULE ORAL
Qty: 6 EACH | Refills: 2 | Status: SHIPPED | OUTPATIENT
Start: 2024-12-11 | End: 2024-12-12 | Stop reason: SDUPTHER

## 2024-12-11 RX ORDER — TIRZEPATIDE 12.5 MG/.5ML
12.5 INJECTION, SOLUTION SUBCUTANEOUS WEEKLY
Qty: 2 ML | Refills: 2 | Status: SHIPPED | OUTPATIENT
Start: 2024-12-11 | End: 2024-12-12 | Stop reason: SDUPTHER

## 2024-12-11 ASSESSMENT — LIFESTYLE VARIABLES: HOW MANY STANDARD DRINKS CONTAINING ALCOHOL DO YOU HAVE ON A TYPICAL DAY: PATIENT DOES NOT DRINK

## 2024-12-11 NOTE — PROGRESS NOTES
"Subjective   Patient ID: Renee Candelario is a 74 y.o. female who presents for Follow-up.  HPI  74 year old female with PMH of T2DM, HTN, HLD, CKD stage 3b, presents for follow up.      DM2: farxiga 10mg, and Mounjaro 12.5mg weekly. Uses CGM.   HTN: losartan 50mg, metoprolol 50mg  HLD: Atorvastatin 80mg daily  CKD: stage 3b GFR 31  Asthma: Advair diskus BID, albuterol PRN. Usually needing inhaler <2x weekly.      Reviewed CGM data. 84% time in target. 8% low. 8% above target.  Having low blood sugars, down to 61 a few times in the past week. Plan to increase mounjaro to 12.5mg and stop glipizde.     Going to exercise class 2x weekly    Due for flu, COVID, RSV, shingrix vaccines. Declines all.     All systems have been reviewed and are negative for complaint other than those mentioned in the HPI.     Objective   /85   Ht 1.6 m (5' 3\") Comment: messured pt today  Wt 116 kg (255 lb)   BMI 45.17 kg/m²    Physical Exam  Constitutional:       General: She is awake.      Appearance: Normal appearance.   HENT:      Head: Normocephalic and atraumatic.   Eyes:      Extraocular Movements: Extraocular movements intact.      Pupils: Pupils are equal, round, and reactive to light.   Cardiovascular:      Rate and Rhythm: Normal rate and regular rhythm.      Heart sounds: S1 normal and S2 normal. No murmur heard.  Pulmonary:      Effort: Pulmonary effort is normal.      Breath sounds: Normal breath sounds.   Musculoskeletal:      Cervical back: Normal range of motion and neck supple.      Right lower leg: No edema.      Left lower leg: No edema.   Skin:     General: Skin is warm and dry.   Neurological:      General: No focal deficit present.      Mental Status: She is alert and oriented to person, place, and time.   Psychiatric:         Mood and Affect: Mood and affect normal.         Behavior: Behavior normal. Behavior is cooperative.         Thought Content: Thought content normal.         Judgment: Judgment normal. "     Renee was seen today for follow-up.  Diagnoses and all orders for this visit:  Type 2 diabetes mellitus with stage 4 chronic kidney disease and hypertension (Multi) (Primary)  -    At goal  - Is having some lows. Will discontinue glipizide and increase dose of mounjaro to 12.5mg weekly. New Rx sent.   - Recently saw ophthalmology, doing well  - Due for podiatry, patient to schedule with podiatrist.   - tirzepatide (Mounjaro) 12.5 mg/0.5 mL pen injector; Inject 12.5 mg under the skin 1 (one) time per week.  -     blood-glucose sensor (FreeStyle Scarlet 3 Plus Sensor) device; 1 each every 14 (fourteen) days.  Class 3 severe obesity due to excess calories with serious comorbidity and body mass index (BMI) of 40.0 to 44.9 in adult   - Decreasing on Mounjaro. Encouragement given. Keep up the good work!   Essential hypertension   - At goal, continue current medication   Asthma with chronic obstructive pulmonary disease (COPD) (Multi)   - Stable. Continue current management.      Due for COVID, Flu, RSV, shingrix, and TDAP vaccines. Patient declines all.     Due for colon cancer screening. Patient attempted cologuard 3+ times, incorrectly collected. Declines colonoscopy.     Follow up in 3 months. Patient informed this provider will be leaving . The patient was given phone numbers and information to schedule follow up with a new PCP.

## 2024-12-11 NOTE — PATIENT INSTRUCTIONS
Andria Murphy MD & Aracely Campbell MD   [facility]   [address], [address]   [place] [address]   Phone: [phone]    Cynthia Garcia MD   [facility]   [address], [address]   [place] [address]   Phone: [phone]

## 2024-12-12 DIAGNOSIS — E11.22 TYPE 2 DIABETES MELLITUS WITH STAGE 4 CHRONIC KIDNEY DISEASE AND HYPERTENSION (MULTI): ICD-10-CM

## 2024-12-12 DIAGNOSIS — I12.9 TYPE 2 DIABETES MELLITUS WITH STAGE 4 CHRONIC KIDNEY DISEASE AND HYPERTENSION (MULTI): ICD-10-CM

## 2024-12-12 DIAGNOSIS — N18.4 TYPE 2 DIABETES MELLITUS WITH STAGE 4 CHRONIC KIDNEY DISEASE AND HYPERTENSION (MULTI): ICD-10-CM

## 2024-12-12 RX ORDER — TIRZEPATIDE 12.5 MG/.5ML
12.5 INJECTION, SOLUTION SUBCUTANEOUS WEEKLY
Qty: 2 ML | Refills: 2 | Status: SHIPPED | OUTPATIENT
Start: 2024-12-12

## 2024-12-12 RX ORDER — BLOOD-GLUCOSE SENSOR
EACH MISCELLANEOUS
Qty: 6 EACH | Refills: 2 | Status: SHIPPED | OUTPATIENT
Start: 2024-12-12

## 2024-12-13 PROCEDURE — RXMED WILLOW AMBULATORY MEDICATION CHARGE

## 2024-12-17 ENCOUNTER — PHARMACY VISIT (OUTPATIENT)
Dept: PHARMACY | Facility: CLINIC | Age: 74
End: 2024-12-17
Payer: COMMERCIAL

## 2024-12-18 ENCOUNTER — APPOINTMENT (OUTPATIENT)
Dept: PRIMARY CARE | Facility: CLINIC | Age: 74
End: 2024-12-18
Payer: MEDICARE

## 2024-12-19 PROCEDURE — RXMED WILLOW AMBULATORY MEDICATION CHARGE

## 2024-12-21 ENCOUNTER — PHARMACY VISIT (OUTPATIENT)
Dept: PHARMACY | Facility: CLINIC | Age: 74
End: 2024-12-21
Payer: COMMERCIAL

## 2024-12-23 PROCEDURE — RXMED WILLOW AMBULATORY MEDICATION CHARGE

## 2024-12-24 ENCOUNTER — PHARMACY VISIT (OUTPATIENT)
Dept: PHARMACY | Facility: CLINIC | Age: 74
End: 2024-12-24
Payer: COMMERCIAL

## 2025-01-14 ENCOUNTER — APPOINTMENT (OUTPATIENT)
Dept: PHARMACY | Facility: HOSPITAL | Age: 75
End: 2025-01-14
Payer: MEDICARE

## 2025-01-14 DIAGNOSIS — I12.9 TYPE 2 DIABETES MELLITUS WITH STAGE 4 CHRONIC KIDNEY DISEASE AND HYPERTENSION (MULTI): ICD-10-CM

## 2025-01-14 DIAGNOSIS — J44.89 ASTHMA WITH CHRONIC OBSTRUCTIVE PULMONARY DISEASE (COPD) (MULTI): ICD-10-CM

## 2025-01-14 DIAGNOSIS — N18.4 TYPE 2 DIABETES MELLITUS WITH STAGE 4 CHRONIC KIDNEY DISEASE AND HYPERTENSION (MULTI): ICD-10-CM

## 2025-01-14 DIAGNOSIS — J45.40 MODERATE PERSISTENT ASTHMA WITHOUT COMPLICATION (HHS-HCC): ICD-10-CM

## 2025-01-14 DIAGNOSIS — E11.22 TYPE 2 DIABETES MELLITUS WITH STAGE 4 CHRONIC KIDNEY DISEASE AND HYPERTENSION (MULTI): ICD-10-CM

## 2025-01-14 NOTE — Clinical Note
Chaka Hogue, pharmacy is following this patient for diabetes and asthma/copd management under referral of Jing Boykin.  She will be establishing care with you, please let me know if you would not like pharmacy to continue following with her.  Thank you!

## 2025-01-14 NOTE — PROGRESS NOTES
Subjective   Patient ID: Renee Candelario is a 74 y.o. female who presents for Diabetes, COPD, and Asthma    Referring Provider: MADELIN Najera     On [facility] for Mounjaro, Farxiga, and Advair, approved through 9/24/25.     Diabetes  She presents for her follow-up diabetic visit. She has type 2 diabetes mellitus. Her disease course has been stable. She is compliant with treatment all of the time (Farxiga 10 mg, Mounjaro 12.5 mg weekly).     Morning sugars between 120-140  Denies hypoglycemia since stopping glipizide    Diet:   Breakfast: eggs occasionally, umanzor, grits, oatmeal, toast  Lunch/Dinner: Meat, chicken, vegetables  Drinks: water, zero sugar soda    Asthma/COPD  She reports good control of symptoms on maintenance Advair  Aggravating Factors: hot temperatures  DEMETRIS use: < or = 2 days/week  Reports significant cost barrier to Advair     Objective     There were no vitals taken for this visit.     Labs  Lab Results   Component Value Date    BILITOT 0.4 09/19/2024    CALCIUM 9.0 09/19/2024    CO2 26 09/19/2024     (H) 09/19/2024    CREATININE 1.73 (H) 09/19/2024    GLUCOSE 158 (H) 09/19/2024    ALKPHOS 107 09/19/2024    K 4.2 09/19/2024    PROT 6.6 09/19/2024     09/19/2024    AST 12 09/19/2024    ALT 10 09/19/2024    BUN 25 (H) 09/19/2024    ANIONGAP 10 09/19/2024    PHOS 5.0 (H) 03/21/2024    ALBUMIN 4.0 09/19/2024    LIPASE 36 07/01/2019    GFRF 36 (A) 07/31/2023     Lab Results   Component Value Date    TRIG 137 09/19/2024    CHOL 151 09/19/2024    LDLCALC 79 09/19/2024    HDL 44.5 09/19/2024     Lab Results   Component Value Date    HGBA1C 7.1 (H) 09/19/2024       Current Outpatient Medications on File Prior to Visit   Medication Sig Dispense Refill    atorvastatin (Lipitor) 80 mg tablet TAKE 1 TABLET(80 MG) BY MOUTH DAILY 90 tablet 0    losartan (Cozaar) 50 mg tablet TAKE 1 TABLET(50 MG) BY MOUTH DAILY 90 tablet 0    metoprolol succinate XL (Toprol-XL) 50 mg 24 hr tablet TAKE 1  TABLET(50 MG) BY MOUTH DAILY 90 tablet 0    albuterol 90 mcg/actuation inhaler Inhale 2 puffs every 6 hours if needed for wheezing or shortness of breath. 18 g 2    aspirin 81 mg EC tablet Take 1 tablet (81 mg) by mouth once daily. 90 tablet 1    blood-glucose sensor (FreeStyle Scarlet 3 Plus Sensor) device Apply 1 sensor every 15 days 6 each 2    dapagliflozin propanediol (Farxiga) 10 mg Take 1 tablet (10 mg) by mouth once daily. 30 tablet 11    fluticasone propion-salmeteroL (Advair Diskus) 250-50 mcg/dose diskus inhaler Inhale 1 puff 2 times a day. 180 each 3    FreeStyle Scarlet 3 [place] misc Use as instructed 1 each 0    multivit-min/ferrous fumarate (MULTI VITAMIN ORAL) Take 1 tablet by mouth once daily.      tirzepatide (Mounjaro) 12.5 mg/0.5 mL pen injector Inject 12.5 mg under the skin 1 (one) time per week. 2 mL 2     No current facility-administered medications on file prior to visit.        Assessment/Plan   Problem List Items Addressed This Visit       Moderate persistent asthma without complication ([facility])    Relevant Orders    Referral to Clinical Pharmacy    Type 2 diabetes mellitus with stage 4 chronic kidney disease and hypertension (Multi)    Relevant Orders    Referral to Clinical Pharmacy    Asthma with chronic obstructive pulmonary disease (COPD) (Multi)    Relevant Orders    Referral to Clinical Pharmacy     Plan: DM  Patient's A1c is stable, with last reading of 7.1% (goal <7%).  At last PCP appointment 9/11/24, Patient has been tolerating 12.5 mg Mounjaro, since this increase she has stopped the glipizide completely and she has had resolution of her low blood sugars.   Her FBG are running borderline controlled 120-140, will follow up in 4 weeks and evaluate control, can further increase Mounjaro if necessary.    CONTINUE Mounjaro 12.5 mg weekly and Farxiga 10 mg daily  CONTINUE monitoring blood glucose with Freestyle Scarlet 3 Plus     Plan: Asthma/COPD  Patient reports continued control with  Advair maintenance inhaler, requiring rescue albuterol infrequently  CONTINUE Advair and albuterol inhaler as needed  No refills needed at this time     Pharmacy Follow Up: 2/11/25 @ 11 am   PCP Follow Up: 3/5/2025 establishing with Dr. Lennie Hernandez, PharmD  Clinical Pharmacy Specialist, Primary Care   [phone]    Continue all meds under the continuation of care with the referring provider and clinical pharmacy team.

## 2025-01-28 PROCEDURE — RXMED WILLOW AMBULATORY MEDICATION CHARGE

## 2025-01-30 ENCOUNTER — PHARMACY VISIT (OUTPATIENT)
Dept: PHARMACY | Facility: CLINIC | Age: 75
End: 2025-01-30
Payer: COMMERCIAL

## 2025-02-11 ENCOUNTER — APPOINTMENT (OUTPATIENT)
Dept: PHARMACY | Facility: HOSPITAL | Age: 75
End: 2025-02-11
Payer: MEDICARE

## 2025-02-11 DIAGNOSIS — E11.22 TYPE 2 DIABETES MELLITUS WITH STAGE 4 CHRONIC KIDNEY DISEASE AND HYPERTENSION (MULTI): Primary | ICD-10-CM

## 2025-02-11 DIAGNOSIS — I12.9 TYPE 2 DIABETES MELLITUS WITH STAGE 4 CHRONIC KIDNEY DISEASE AND HYPERTENSION (MULTI): Primary | ICD-10-CM

## 2025-02-11 DIAGNOSIS — J45.40 MODERATE PERSISTENT ASTHMA WITHOUT COMPLICATION (HHS-HCC): ICD-10-CM

## 2025-02-11 DIAGNOSIS — J44.89 ASTHMA WITH CHRONIC OBSTRUCTIVE PULMONARY DISEASE (COPD) (MULTI): ICD-10-CM

## 2025-02-11 DIAGNOSIS — N18.4 TYPE 2 DIABETES MELLITUS WITH STAGE 4 CHRONIC KIDNEY DISEASE AND HYPERTENSION (MULTI): Primary | ICD-10-CM

## 2025-02-11 NOTE — PROGRESS NOTES
Subjective   Patient ID: Renee Candelario is a 74 y.o. female who presents for Asthma and Diabetes    Referring Provider: MADELIN Najera     On  VAF for Mounjaro, Farxiga, and Advair, approved through 9/24/25.     Diabetes  She presents for her follow-up diabetic visit. She has type 2 diabetes mellitus. Her disease course has been stable. She is compliant with treatment all of the time (Farxiga 10 mg, Mounjaro 12.5 mg weekly).     Morning sugars between 120-140  Denies hypoglycemia since stopping glipizide    Diet:   Breakfast: eggs occasionally, umanzor, grits, oatmeal, toast  Lunch/Dinner: Meat, chicken, vegetables  Drinks: water, zero sugar soda    Asthma/COPD  She reports good control of symptoms on maintenance Advair  Aggravating Factors: hot temperatures  DEMETRIS use: < or = 2 days/week  Reports significant cost barrier to Advair     Objective     There were no vitals taken for this visit.     Labs  Lab Results   Component Value Date    BILITOT 0.4 09/19/2024    CALCIUM 9.0 09/19/2024    CO2 26 09/19/2024     (H) 09/19/2024    CREATININE 1.73 (H) 09/19/2024    GLUCOSE 158 (H) 09/19/2024    ALKPHOS 107 09/19/2024    K 4.2 09/19/2024    PROT 6.6 09/19/2024     09/19/2024    AST 12 09/19/2024    ALT 10 09/19/2024    BUN 25 (H) 09/19/2024    ANIONGAP 10 09/19/2024    PHOS 5.0 (H) 03/21/2024    ALBUMIN 4.0 09/19/2024    LIPASE 36 07/01/2019    GFRF 36 (A) 07/31/2023     Lab Results   Component Value Date    TRIG 137 09/19/2024    CHOL 151 09/19/2024    LDLCALC 79 09/19/2024    HDL 44.5 09/19/2024     Lab Results   Component Value Date    HGBA1C 7.1 (H) 09/19/2024       Current Outpatient Medications on File Prior to Visit   Medication Sig Dispense Refill    atorvastatin (Lipitor) 80 mg tablet TAKE 1 TABLET(80 MG) BY MOUTH DAILY 90 tablet 0    losartan (Cozaar) 50 mg tablet TAKE 1 TABLET(50 MG) BY MOUTH DAILY 90 tablet 0    metoprolol succinate XL (Toprol-XL) 50 mg 24 hr tablet TAKE 1 TABLET(50 MG)  BY MOUTH DAILY 90 tablet 0    albuterol 90 mcg/actuation inhaler Inhale 2 puffs every 6 hours if needed for wheezing or shortness of breath. 18 g 2    aspirin 81 mg EC tablet Take 1 tablet (81 mg) by mouth once daily. 90 tablet 1    blood-glucose sensor (FreeStyle Scarlet 3 Plus Sensor) device Apply 1 sensor every 15 days 6 each 2    dapagliflozin propanediol (Farxiga) 10 mg Take 1 tablet (10 mg) by mouth once daily. 30 tablet 11    fluticasone propion-salmeteroL (Advair Diskus) 250-50 mcg/dose diskus inhaler Inhale 1 puff 2 times a day. 180 each 3    FreeStyle Scarlet 3 [place] misc Use as instructed 1 each 0    multivit-min/ferrous fumarate (MULTI VITAMIN ORAL) Take 1 tablet by mouth once daily.      tirzepatide (Mounjaro) 12.5 mg/0.5 mL pen injector Inject 12.5 mg under the skin 1 (one) time per week. 2 mL 2     No current facility-administered medications on file prior to visit.        Assessment/Plan   Problem List Items Addressed This Visit       Moderate persistent asthma without complication ([facility])    Type 2 diabetes mellitus with stage 4 chronic kidney disease and hypertension (Multi) - Primary    Relevant Orders    Referral to Clinical Pharmacy    Asthma with chronic obstructive pulmonary disease (COPD) (Multi)    Relevant Orders    Referral to Clinical Pharmacy       Plan: DM  Patient's A1c is stable, with last reading of 7.1% (goal <7%).  At last PCP appointment 9/11/24, Patient has been tolerating 12.5 mg Mounjaro, since this increase she has stopped the glipizide completely and she has had resolution of her low blood sugars.   Her FBG are continuing at borderline controlled 120-140, will follow up after next PCP appointment.     CONTINUE Mounjaro 12.5 mg weekly and Farxiga 10 mg daily  CONTINUE monitoring blood glucose with Freestyle Scarlet 3 Plus     Plan: Asthma/COPD  Patient reports continued control with Advair maintenance inhaler, requiring rescue albuterol infrequently  CONTINUE Advair and albuterol  inhaler as needed  No refills needed at this time     Pharmacy Follow Up: 3/11/25 @ 11 am   PCP Follow Up: 3/5/2025 establishing with Dr. Lennie Hernandez, PharmD  Clinical Pharmacy Specialist, Primary Care   [phone]    Continue all meds under the continuation of care with the referring provider and clinical pharmacy team.

## 2025-02-20 PROCEDURE — RXMED WILLOW AMBULATORY MEDICATION CHARGE

## 2025-02-22 ENCOUNTER — PHARMACY VISIT (OUTPATIENT)
Dept: PHARMACY | Facility: CLINIC | Age: 75
End: 2025-02-22
Payer: COMMERCIAL

## 2025-02-24 ENCOUNTER — PHARMACY VISIT (OUTPATIENT)
Dept: PHARMACY | Facility: CLINIC | Age: 75
End: 2025-02-24

## 2025-02-24 PROCEDURE — RXMED WILLOW AMBULATORY MEDICATION CHARGE

## 2025-02-25 DIAGNOSIS — I10 ESSENTIAL HYPERTENSION: ICD-10-CM

## 2025-02-25 DIAGNOSIS — E78.00 HYPERCHOLESTEROLEMIA: ICD-10-CM

## 2025-02-25 RX ORDER — ATORVASTATIN CALCIUM 80 MG/1
80 TABLET, FILM COATED ORAL DAILY
Qty: 90 TABLET | Refills: 0 | Status: SHIPPED | OUTPATIENT
Start: 2025-02-25

## 2025-02-25 RX ORDER — METOPROLOL SUCCINATE 50 MG/1
50 TABLET, EXTENDED RELEASE ORAL DAILY
Qty: 90 TABLET | Refills: 0 | Status: SHIPPED | OUTPATIENT
Start: 2025-02-25

## 2025-02-25 RX ORDER — LOSARTAN POTASSIUM 50 MG/1
50 TABLET ORAL DAILY
Qty: 90 TABLET | Refills: 0 | Status: SHIPPED | OUTPATIENT
Start: 2025-02-25

## 2025-02-26 ENCOUNTER — PHARMACY VISIT (OUTPATIENT)
Dept: PHARMACY | Facility: CLINIC | Age: 75
End: 2025-02-26
Payer: COMMERCIAL

## 2025-03-05 ENCOUNTER — APPOINTMENT (OUTPATIENT)
Dept: PRIMARY CARE | Facility: CLINIC | Age: 75
End: 2025-03-05
Payer: MEDICARE

## 2025-03-05 VITALS
HEART RATE: 88 BPM | SYSTOLIC BLOOD PRESSURE: 99 MMHG | DIASTOLIC BLOOD PRESSURE: 65 MMHG | BODY MASS INDEX: 43.23 KG/M2 | WEIGHT: 244 LBS | HEIGHT: 63 IN

## 2025-03-05 DIAGNOSIS — N18.32 STAGE 3B CHRONIC KIDNEY DISEASE (MULTI): ICD-10-CM

## 2025-03-05 DIAGNOSIS — E11.69 TYPE 2 DIABETES MELLITUS WITH OTHER SPECIFIED COMPLICATION, WITHOUT LONG-TERM CURRENT USE OF INSULIN: ICD-10-CM

## 2025-03-05 DIAGNOSIS — Z13.29 SCREENING FOR THYROID DISORDER: ICD-10-CM

## 2025-03-05 DIAGNOSIS — E55.9 VITAMIN D DEFICIENCY: Primary | ICD-10-CM

## 2025-03-05 DIAGNOSIS — J44.89 ASTHMA WITH CHRONIC OBSTRUCTIVE PULMONARY DISEASE (COPD) (MULTI): ICD-10-CM

## 2025-03-05 PROCEDURE — 1170F FXNL STATUS ASSESSED: CPT | Performed by: INTERNAL MEDICINE

## 2025-03-05 PROCEDURE — 4010F ACE/ARB THERAPY RXD/TAKEN: CPT | Performed by: INTERNAL MEDICINE

## 2025-03-05 PROCEDURE — 99215 OFFICE O/P EST HI 40 MIN: CPT | Performed by: INTERNAL MEDICINE

## 2025-03-05 PROCEDURE — 3078F DIAST BP <80 MM HG: CPT | Performed by: INTERNAL MEDICINE

## 2025-03-05 PROCEDURE — G2211 COMPLEX E/M VISIT ADD ON: HCPCS | Performed by: INTERNAL MEDICINE

## 2025-03-05 PROCEDURE — G0439 PPPS, SUBSEQ VISIT: HCPCS | Performed by: INTERNAL MEDICINE

## 2025-03-05 PROCEDURE — 1124F ACP DISCUSS-NO DSCNMKR DOCD: CPT | Performed by: INTERNAL MEDICINE

## 2025-03-05 PROCEDURE — 1159F MED LIST DOCD IN RCRD: CPT | Performed by: INTERNAL MEDICINE

## 2025-03-05 PROCEDURE — 1036F TOBACCO NON-USER: CPT | Performed by: INTERNAL MEDICINE

## 2025-03-05 PROCEDURE — 3074F SYST BP LT 130 MM HG: CPT | Performed by: INTERNAL MEDICINE

## 2025-03-05 PROCEDURE — 3008F BODY MASS INDEX DOCD: CPT | Performed by: INTERNAL MEDICINE

## 2025-03-05 ASSESSMENT — LIFESTYLE VARIABLES
HOW MANY STANDARD DRINKS CONTAINING ALCOHOL DO YOU HAVE ON A TYPICAL DAY: PATIENT DOES NOT DRINK
AUDIT-C TOTAL SCORE: 0
HOW OFTEN DO YOU HAVE SIX OR MORE DRINKS ON ONE OCCASION: NEVER
HOW OFTEN DO YOU HAVE A DRINK CONTAINING ALCOHOL: NEVER
SKIP TO QUESTIONS 9-10: 1

## 2025-03-05 ASSESSMENT — ENCOUNTER SYMPTOMS
OCCASIONAL FEELINGS OF UNSTEADINESS: 0
DEPRESSION: 0
FEVER: 0
DIFFICULTY URINATING: 0
SHORTNESS OF BREATH: 0
FATIGUE: 0
ABDOMINAL PAIN: 0
LOSS OF SENSATION IN FEET: 0

## 2025-03-05 ASSESSMENT — ACTIVITIES OF DAILY LIVING (ADL)
MANAGING_FINANCES: INDEPENDENT
TAKING_MEDICATION: INDEPENDENT
DOING_HOUSEWORK: INDEPENDENT
GROCERY_SHOPPING: INDEPENDENT
DRESSING: INDEPENDENT
BATHING: INDEPENDENT

## 2025-03-05 ASSESSMENT — PATIENT HEALTH QUESTIONNAIRE - PHQ9
1. LITTLE INTEREST OR PLEASURE IN DOING THINGS: NOT AT ALL
2. FEELING DOWN, DEPRESSED OR HOPELESS: NOT AT ALL
1. LITTLE INTEREST OR PLEASURE IN DOING THINGS: NOT AT ALL
SUM OF ALL RESPONSES TO PHQ9 QUESTIONS 1 AND 2: 0
2. FEELING DOWN, DEPRESSED OR HOPELESS: NOT AT ALL
SUM OF ALL RESPONSES TO PHQ9 QUESTIONS 1 AND 2: 0

## 2025-03-05 NOTE — PROGRESS NOTES
Chief Complaint:   Chief Complaint   Patient presents with    Medicare Annual Wellness Visit Initial      HPI    The patient is being seen for the subsequent annual wellness visit and follow up.  Past Medical, Surgical and Family History: reviewed and updated in chart.   Interval History: Patient has not been hospitalized previously.   Medications and Supplements: Review of all medications by a prescribing practitioner or clinical pharmacist (such as prescriptions, OTCs, herbal therapies and supplements) documented in the medical record.    No, the patient is not using opioids.   Health Risk Assessment:. Paper HRA completed by patient and scanned into chart.   Depression/Suicide Screening:  .   Done  No falls in the past year.  No recent hospitalizations.  Advance care planning completed.     Past Medical History   History reviewed. No pertinent past medical history.   Past Surgical History:   History reviewed. No pertinent surgical history.  Family History:   No family history on file.  Social History:   Tobacco Use: Low Risk  (3/5/2025)    Patient History     Smoking Tobacco Use: Never     Smokeless Tobacco Use: Never     Passive Exposure: Past      Social History     Substance and Sexual Activity   Alcohol Use Not Currently        Allergies:   Allergies   Allergen Reactions    Lisinopril Cough     cough    Other reaction(s): Cough   cough   cough   cough   cough    cough   cough        ROS   Review of Systems   Constitutional:  Negative for fatigue and fever.   Respiratory:  Negative for shortness of breath.    Cardiovascular:  Negative for chest pain.   Gastrointestinal:  Negative for abdominal pain.   Genitourinary:  Negative for difficulty urinating.        Objective   Vitals:    03/05/25 1402   BP: 99/65   Pulse: 88      BMI Readings from Last 15 Encounters:   03/05/25 43.22 kg/m²   12/11/24 45.17 kg/m²   09/11/24 43.43 kg/m²   06/14/24 43.66 kg/m²   06/05/24 43.60 kg/m²   03/13/24 43.10 kg/m²   11/29/23  "43.93 kg/m²   10/30/23 44.60 kg/m²   09/29/23 51.21 kg/m²   07/31/23 46.10 kg/m²   05/25/23 47.09 kg/m²   04/27/23 46.32 kg/m²   04/14/23 44.99 kg/m²      111 kg (244 lb) (3/5/2025  2:02 PM)      Physical Exam  Physical Exam  Vitals and nursing note reviewed.   HENT:      Head: Normocephalic.      Nose: Nose normal.      Mouth/Throat:      Mouth: Mucous membranes are moist.   Eyes:      Pupils: Pupils are equal, round, and reactive to light.   Cardiovascular:      Rate and Rhythm: Normal rate and regular rhythm.      Pulses: Normal pulses.      Heart sounds: Normal heart sounds.   Pulmonary:      Effort: Pulmonary effort is normal.   Abdominal:      General: Bowel sounds are normal.      Palpations: Abdomen is soft.   Skin:     General: Skin is warm and dry.   Neurological:      General: No focal deficit present.      Mental Status: She is alert.          Labs:  CBC:  Recent Labs     04/14/23  1444 07/02/19  0450 07/01/19  0658   WBC 8.5 8.4 8.2   HGB 12.4 12.3 13.7   HCT 41.9 38.8 42.1    287 310   * 93 91     CMP:  Recent Labs     09/19/24  1225 03/21/24  1344 10/30/23  1400    140 142   K 4.2 4.1 4.3   * 105 106   CO2 26 24 22   ANIONGAP 10 15 18   BUN 25* 26* 27*   CREATININE 1.73* 1.61* 1.99*   EGFR 31* 34* 26*   GLUCOSE 158* 126* 185*     Recent Labs     09/19/24  1225 03/21/24  1344 10/30/23  1400 04/14/23  1444 07/01/19  0658   ALBUMIN 4.0 3.7 4.1   < > 4.3   ALKPHOS 107 99 105   < > 119   ALT 10 12 14   < > 17   AST 12 12 11   < > 18   BILITOT 0.4 0.4 0.3   < > 0.5   LIPASE  --   --   --   --  36    < > = values in this interval not displayed.     Calcium/Phos:   Lab Results   Component Value Date    CALCIUM 9.0 09/19/2024    PHOS 5.0 (H) 03/21/2024      COAG:   Recent Labs     07/01/19  1158   DDIMERVTE 926*     CRP: No results found for: \"CRP\"   [unfilled]   ENDO:  Recent Labs     09/19/24  1225 06/14/24  1354 03/13/24  1414 10/30/23  1400 07/31/23  1322 04/14/23  1444 " 06/05/20  1250 07/02/19  0450   TSH  --   --   --   --   --  1.24  --  0.89   HGBA1C 7.1* 6.8* 7.5* 7.7*   < > 10.7*   < >  --     < > = values in this interval not displayed.      CARDIAC:   Recent Labs     07/01/19  1150   *     Recent Labs     09/19/24  1225 10/30/23  1400 04/14/23  1444   CHOL 151 188 188   LDLF  --   --  83   LDLCALC 79 110*  --    HDL 44.5 43.7 49.2   TRIG 137 171* 279*     No data recorded    Current Medications:  Current Outpatient Medications   Medication Sig Dispense Refill    albuterol 90 mcg/actuation inhaler Inhale 2 puffs every 6 hours if needed for wheezing or shortness of breath. 18 g 2    aspirin 81 mg EC tablet Take 1 tablet (81 mg) by mouth once daily. 90 tablet 1    atorvastatin (Lipitor) 80 mg tablet Take 1 tablet (80 mg) by mouth once daily. 90 tablet 0    blood-glucose sensor (FreeStyle Scarlet 3 Plus Sensor) device Apply 1 sensor every 15 days 6 each 2    dapagliflozin propanediol (Farxiga) 10 mg Take 1 tablet (10 mg) by mouth once daily. 30 tablet 11    fluticasone propion-salmeteroL (Advair Diskus) 250-50 mcg/dose diskus inhaler Inhale 1 puff 2 times a day. 180 each 3    FreeStyle Scarlet 3 [place] misc Use as instructed 1 each 0    losartan (Cozaar) 50 mg tablet Take 1 tablet (50 mg) by mouth once daily. 90 tablet 0    metoprolol succinate XL (Toprol-XL) 50 mg 24 hr tablet Take 1 tablet (50 mg) by mouth once daily. 90 tablet 0    multivit-min/ferrous fumarate (MULTI VITAMIN ORAL) Take 1 tablet by mouth once daily.      tirzepatide (Mounjaro) 12.5 mg/0.5 mL pen injector Inject 12.5 mg under the skin 1 (one) time per week. 2 mL 2     No current facility-administered medications for this visit.       Assessment and Plan  Renee was seen today for medicare annual wellness visit initial.  Diagnoses and all orders for this visit:  Vitamin D deficiency (Primary)  -     Vitamin D 25-Hydroxy,Total (for eval of Vitamin D levels); Future  -     Vitamin D 25-Hydroxy,Total (for  eval of Vitamin D levels)  Asthma with chronic obstructive pulmonary disease (COPD) (Multi)  Type 2 diabetes mellitus with other specified complication, without long-term current use of insulin  -     Lipid panel; Future  -     Hemoglobin A1c; Future  -     Lipid panel  -     Hemoglobin A1c  Stage 3b chronic kidney disease (Multi)  -     CBC; Future  -     Comprehensive metabolic panel; Future  -     CBC  -     Comprehensive metabolic panel  Screening for thyroid disorder  -     TSH with reflex to Free T4 if abnormal; Future  -     TSH with reflex to Free T4 if abnormal       Immunizations:  Immunization History   Administered Date(s) Administered    Flu vaccine, quadrivalent, high-dose, preservative free, age 65y+ (FLUZONE) 10/24/2005    Influenza, seasonal, injectable 10/24/2005    Moderna COVID-19 vaccine, bivalent, blue cap/gray label *Check age/dose* 10/09/2022    Moderna SARS-CoV-2 Vaccination 03/26/2021, 04/23/2021, 12/08/2021, 04/16/2022    PPD Test 11/23/2012    Pneumococcal conjugate vaccine, 13-valent (PREVNAR 13) 09/03/2015    Pneumococcal polysaccharide vaccine, 23-valent, age 2 years and older (PNEUMOVAX 23) 02/21/2005, 09/11/2018    Tdap vaccine, age 7 year and older (BOOSTRIX, ADACEL) 11/23/2012     75 y/o Female w/ PMH of T2DM, HTN, HLD and CKD 3b presents for Wellness visit. Her blood pressure was 99/65 and she reports that her blood pressure fluctuates to highs of systolic 150's -160's and lows of 130's -120's. Recommended to change scheduled of her antihypertensive regimen from Losartan and Metoprolol in the evening to Losartan during the day and Metoprolol at night. She is continuing to lose weight and has lost 11 lbs since her last visit. She is not interested in getting colonoscopy for colon cancer screening and cologuard kit was already sent but was not done. She is not interested in getting any vaccines this visit.    #HTN  - Losartan daily  - Metoprolol nightly  - Continue diet and  exercise    #Health Maintenance  - CBC, CMP, Lipid panel, TSH w/ Reflex T4, HbA1c, Vitamin D Levels    Follow up in 3 months    Renzo Grayson MD  Internal Medicine, PGY-II    Medicare Wellness Billing Compliance Satisfied    *This is a visual tool to show completion of required items on the day of the visit. Green checks will only appear on the date of visit.    Review all medications by prescribing practitioner or clinical pharmacist (such as prescriptions, OTCs, herbal therapies and supplements) documented in the medical record    Past Medical, Surgical, and Family History reviewed and updated in chart    Tobacco Use Reviewed    Alcohol Use Reviewed    Illicit Drug Use Reviewed    PHQ2/9    Falls in Last Year Reviewed    Home Safety Risk Factors Reviewed    Cognitive Impairment Reviewed    Patient Self Assessment and Health Status    Current Diet Reviewed    Exercise Frequency    ADL - Hearing Impairment    ADL - Bathing    ADL - Dressing    ADL - Walks in Home    IADL - Managing Finances    IADL - Grocery Shopping    IADL - Taking Medications    IADL - Doing Housework    Vision Screening

## 2025-03-05 NOTE — PROGRESS NOTES

## 2025-03-06 LAB
25(OH)D3+25(OH)D2 SERPL-MCNC: 29 NG/ML (ref 30–100)
ALBUMIN SERPL-MCNC: 4 G/DL (ref 3.6–5.1)
ALP SERPL-CCNC: 101 U/L (ref 37–153)
ALT SERPL-CCNC: 10 U/L (ref 6–29)
ANION GAP SERPL CALCULATED.4IONS-SCNC: 8 MMOL/L (CALC) (ref 7–17)
AST SERPL-CCNC: 13 U/L (ref 10–35)
BILIRUB SERPL-MCNC: 0.3 MG/DL (ref 0.2–1.2)
BUN SERPL-MCNC: 29 MG/DL (ref 7–25)
CALCIUM SERPL-MCNC: 9.1 MG/DL (ref 8.6–10.4)
CHLORIDE SERPL-SCNC: 106 MMOL/L (ref 98–110)
CHOLEST SERPL-MCNC: 150 MG/DL
CHOLEST/HDLC SERPL: 3.1 (CALC)
CO2 SERPL-SCNC: 26 MMOL/L (ref 20–32)
CREAT SERPL-MCNC: 1.65 MG/DL (ref 0.6–1)
EGFRCR SERPLBLD CKD-EPI 2021: 32 ML/MIN/1.73M2
ERYTHROCYTE [DISTWIDTH] IN BLOOD BY AUTOMATED COUNT: 13.3 % (ref 11–15)
EST. AVERAGE GLUCOSE BLD GHB EST-MCNC: 160 MG/DL
EST. AVERAGE GLUCOSE BLD GHB EST-SCNC: 8.9 MMOL/L
GLUCOSE SERPL-MCNC: 155 MG/DL (ref 65–139)
HBA1C MFR BLD: 7.2 % OF TOTAL HGB
HCT VFR BLD AUTO: 37.2 % (ref 35–45)
HDLC SERPL-MCNC: 48 MG/DL
HGB BLD-MCNC: 12 G/DL (ref 11.7–15.5)
LDLC SERPL CALC-MCNC: 79 MG/DL (CALC)
MCH RBC QN AUTO: 30.1 PG (ref 27–33)
MCHC RBC AUTO-ENTMCNC: 32.3 G/DL (ref 32–36)
MCV RBC AUTO: 93.2 FL (ref 80–100)
NONHDLC SERPL-MCNC: 102 MG/DL (CALC)
PLATELET # BLD AUTO: 240 THOUSAND/UL (ref 140–400)
PMV BLD REES-ECKER: 10.6 FL (ref 7.5–12.5)
POTASSIUM SERPL-SCNC: 3.9 MMOL/L (ref 3.5–5.3)
PROT SERPL-MCNC: 6.6 G/DL (ref 6.1–8.1)
RBC # BLD AUTO: 3.99 MILLION/UL (ref 3.8–5.1)
SODIUM SERPL-SCNC: 140 MMOL/L (ref 135–146)
TRIGL SERPL-MCNC: 129 MG/DL
TSH SERPL-ACNC: 0.79 MIU/L (ref 0.4–4.5)
WBC # BLD AUTO: 5.6 THOUSAND/UL (ref 3.8–10.8)

## 2025-03-06 NOTE — RESULT ENCOUNTER NOTE
Results reviewed.  There are some minor abnormalities, which are not concerning.  No changes in medications are needed at this time.  Please continue with current treatment.    Best,  Dr. Hogue

## 2025-03-11 ENCOUNTER — APPOINTMENT (OUTPATIENT)
Dept: PHARMACY | Facility: HOSPITAL | Age: 75
End: 2025-03-11
Payer: MEDICARE

## 2025-03-11 DIAGNOSIS — E11.22 TYPE 2 DIABETES MELLITUS WITH STAGE 4 CHRONIC KIDNEY DISEASE AND HYPERTENSION (MULTI): ICD-10-CM

## 2025-03-11 DIAGNOSIS — I12.9 TYPE 2 DIABETES MELLITUS WITH STAGE 4 CHRONIC KIDNEY DISEASE AND HYPERTENSION (MULTI): ICD-10-CM

## 2025-03-11 DIAGNOSIS — N18.4 TYPE 2 DIABETES MELLITUS WITH STAGE 4 CHRONIC KIDNEY DISEASE AND HYPERTENSION (MULTI): ICD-10-CM

## 2025-03-11 DIAGNOSIS — J44.89 ASTHMA WITH CHRONIC OBSTRUCTIVE PULMONARY DISEASE (COPD) (MULTI): ICD-10-CM

## 2025-03-11 RX ORDER — TIRZEPATIDE 12.5 MG/.5ML
12.5 INJECTION, SOLUTION SUBCUTANEOUS WEEKLY
Qty: 2 ML | Refills: 2 | Status: SHIPPED | OUTPATIENT
Start: 2025-03-11

## 2025-03-11 NOTE — PROGRESS NOTES
Clinical Pharmacy Appointment    Patient ID: Renee Candelario is a 74 y.o. female who presents for Diabetes, Asthma, and COPD.    Pt is here for Follow Up appointment.     Referring Provider: Lennie Langston, *  PCP: Lennie Langston MD   Last visit with PCP: 3/5/25   Next visit with PCP: 6/6/25    On  VAF for Mounjaro, Farxiga, and Advair, approved through 9/24/25.     Diabetes  She presents for her follow-up diabetic visit. She has type 2 diabetes mellitus. Her disease course has been stable. She is compliant with treatment all of the time (Farxiga 10 mg, Mounjaro 12.5 mg weekly).     Morning sugars running a little higher 140s-160s  TIR reported 89% (unclear length of time this is spanning)    Diet:   Breakfast: eggs occasionally, umanzor, grits, oatmeal, toast  Lunch/Dinner: Meat, chicken, vegetables  Drinks: water, zero sugar soda    Asthma/COPD  She reports good control of symptoms on maintenance Advair  Aggravating Factors: hot temperatures  DEMETRIS use: < or = 2 days/week    Objective     There were no vitals taken for this visit.     Labs  Lab Results   Component Value Date    BILITOT 0.3 03/05/2025    CALCIUM 9.1 03/05/2025    CO2 26 03/05/2025     03/05/2025    CREATININE 1.65 (H) 03/05/2025    GLUCOSE 155 (H) 03/05/2025    ALKPHOS 101 03/05/2025    K 3.9 03/05/2025    PROT 6.6 03/05/2025     03/05/2025    AST 13 03/05/2025    ALT 10 03/05/2025    BUN 29 (H) 03/05/2025    ANIONGAP 8 03/05/2025    PHOS 5.0 (H) 03/21/2024    ALBUMIN 4.0 03/05/2025    LIPASE 36 07/01/2019    GFRF 36 (A) 07/31/2023     Lab Results   Component Value Date    TRIG 129 03/05/2025    CHOL 150 03/05/2025    LDLCALC 79 03/05/2025    HDL 48 (L) 03/05/2025     Lab Results   Component Value Date    HGBA1C 7.2 (H) 03/05/2025       Current Outpatient Medications on File Prior to Visit   Medication Sig Dispense Refill    albuterol 90 mcg/actuation inhaler Inhale 2 puffs every 6 hours if needed for wheezing or  shortness of breath. 18 g 2    aspirin 81 mg EC tablet Take 1 tablet (81 mg) by mouth once daily. 90 tablet 1    atorvastatin (Lipitor) 80 mg tablet Take 1 tablet (80 mg) by mouth once daily. 90 tablet 0    blood-glucose sensor (FreeStyle Scarlet 3 Plus Sensor) device Apply 1 sensor every 15 days 6 each 2    dapagliflozin propanediol (Farxiga) 10 mg Take 1 tablet (10 mg) by mouth once daily. 30 tablet 11    fluticasone propion-salmeteroL (Advair Diskus) 250-50 mcg/dose diskus inhaler Inhale 1 puff 2 times a day. 180 each 3    FreeStyle Scarlet 3 [place] misc Use as instructed 1 each 0    losartan (Cozaar) 50 mg tablet Take 1 tablet (50 mg) by mouth once daily. 90 tablet 0    metoprolol succinate XL (Toprol-XL) 50 mg 24 hr tablet Take 1 tablet (50 mg) by mouth once daily. 90 tablet 0    multivit-min/ferrous fumarate (MULTI VITAMIN ORAL) Take 1 tablet by mouth once daily.      [DISCONTINUED] tirzepatide (Mounjaro) 12.5 mg/0.5 mL pen injector Inject 12.5 mg under the skin 1 (one) time per week. 2 mL 2     No current facility-administered medications on file prior to visit.        Assessment/Plan   Problem List Items Addressed This Visit       Type 2 diabetes mellitus with stage 4 chronic kidney disease and hypertension (Multi)    Relevant Medications    tirzepatide (Mounjaro) 12.5 mg/0.5 mL pen injector    Other Relevant Orders    Referral to Clinical Pharmacy    Asthma with chronic obstructive pulmonary disease (COPD) (Multi)    Relevant Orders    Referral to Clinical Pharmacy       Plan: DM  Patient's A1c is stable, with last reading of 7.2% (goal <7%).  We had previously stopped glipizide due to overnight lows.  Her A1c is stable but FBG are slightly higher than goal.  Discussed potential dose titration for Mounjaro, she would like to stay on this dose for another month and we will revisit based on blood sugars at follow up.     CONTINUE Mounjaro 12.5 mg weekly and Farxiga 10 mg daily  Refill for Mounjaro sent to   [facility] for mail to patient  CONTINUE monitoring blood glucose with Freestyle Scarlet 3 Plus     Plan: Asthma/COPD  Patient reports continued control with Advair maintenance inhaler, requiring rescue albuterol infrequently  CONTINUE Advair and albuterol inhaler as needed  No refills needed at this time     Pharmacy Follow Up: 4/17/25 @ 11 am     Osiris ScanlonD  Clinical Pharmacy Specialist, Primary Care   [phone]    Continue all meds under the continuation of care with the referring provider and clinical pharmacy team.

## 2025-03-13 PROCEDURE — RXMED WILLOW AMBULATORY MEDICATION CHARGE

## 2025-03-14 ENCOUNTER — PHARMACY VISIT (OUTPATIENT)
Dept: PHARMACY | Facility: CLINIC | Age: 75
End: 2025-03-14
Payer: COMMERCIAL

## 2025-03-24 PROCEDURE — RXMED WILLOW AMBULATORY MEDICATION CHARGE

## 2025-03-26 ENCOUNTER — PHARMACY VISIT (OUTPATIENT)
Dept: PHARMACY | Facility: CLINIC | Age: 75
End: 2025-03-26
Payer: COMMERCIAL

## 2025-04-17 ENCOUNTER — APPOINTMENT (OUTPATIENT)
Dept: PHARMACY | Facility: HOSPITAL | Age: 75
End: 2025-04-17
Payer: MEDICARE

## 2025-04-17 DIAGNOSIS — I12.9 TYPE 2 DIABETES MELLITUS WITH STAGE 4 CHRONIC KIDNEY DISEASE AND HYPERTENSION (MULTI): ICD-10-CM

## 2025-04-17 DIAGNOSIS — J44.89 ASTHMA WITH CHRONIC OBSTRUCTIVE PULMONARY DISEASE (COPD) (MULTI): ICD-10-CM

## 2025-04-17 DIAGNOSIS — E11.22 TYPE 2 DIABETES MELLITUS WITH STAGE 4 CHRONIC KIDNEY DISEASE AND HYPERTENSION (MULTI): ICD-10-CM

## 2025-04-17 DIAGNOSIS — N18.4 TYPE 2 DIABETES MELLITUS WITH STAGE 4 CHRONIC KIDNEY DISEASE AND HYPERTENSION (MULTI): ICD-10-CM

## 2025-04-17 PROCEDURE — RXMED WILLOW AMBULATORY MEDICATION CHARGE

## 2025-04-17 RX ORDER — DAPAGLIFLOZIN 10 MG/1
10 TABLET, FILM COATED ORAL DAILY
Qty: 30 TABLET | Refills: 2 | Status: SHIPPED | OUTPATIENT
Start: 2025-04-17

## 2025-04-17 RX ORDER — TIRZEPATIDE 12.5 MG/.5ML
12.5 INJECTION, SOLUTION SUBCUTANEOUS WEEKLY
Qty: 2 ML | Refills: 2 | Status: SHIPPED | OUTPATIENT
Start: 2025-04-17

## 2025-04-17 NOTE — PROGRESS NOTES
Clinical Pharmacy Appointment    Patient ID: Renee Candelario is a 75 y.o. female who presents for COPD, Asthma, and Diabetes.    Pt is here for Follow Up appointment.     Referring Provider: Lennie Langston, *  PCP: Lennie Langston MD   Last visit with PCP: 3/5/25   Next visit with PCP: 6/6/25    On  VAF for Mounjaro, Farxiga, and Advair, approved through 9/24/25.     Diabetes  She presents for her follow-up diabetic visit. She has type 2 diabetes mellitus. Her disease course has been stable. She is compliant with treatment all of the time (Farxiga 10 mg, Mounjaro 12.5 mg weekly).     Morning sugars are back down in goal -130s  TIR reported 89% (unclear length of time this is spanning)    Diet:   Breakfast: eggs occasionally, umanzor, grits, oatmeal, toast  Lunch/Dinner: Meat, chicken, vegetables  Drinks: water, zero sugar soda    Asthma/COPD  She reports good control of symptoms on maintenance Advair  Aggravating Factors: hot temperatures  DEMETRIS use: < or = 2 days/week    Objective     There were no vitals taken for this visit.     Labs  Lab Results   Component Value Date    BILITOT 0.3 03/05/2025    CALCIUM 9.1 03/05/2025    CO2 26 03/05/2025     03/05/2025    CREATININE 1.65 (H) 03/05/2025    GLUCOSE 155 (H) 03/05/2025    ALKPHOS 101 03/05/2025    K 3.9 03/05/2025    PROT 6.6 03/05/2025     03/05/2025    AST 13 03/05/2025    ALT 10 03/05/2025    BUN 29 (H) 03/05/2025    ANIONGAP 8 03/05/2025    PHOS 5.0 (H) 03/21/2024    ALBUMIN 4.0 03/05/2025    LIPASE 36 07/01/2019    GFRF 36 (A) 07/31/2023     Lab Results   Component Value Date    TRIG 129 03/05/2025    CHOL 150 03/05/2025    LDLCALC 79 03/05/2025    HDL 48 (L) 03/05/2025     Lab Results   Component Value Date    HGBA1C 7.2 (H) 03/05/2025       Current Outpatient Medications on File Prior to Visit   Medication Sig Dispense Refill    albuterol 90 mcg/actuation inhaler Inhale 2 puffs every 6 hours if needed for wheezing or  shortness of breath. 18 g 2    aspirin 81 mg EC tablet Take 1 tablet (81 mg) by mouth once daily. 90 tablet 1    atorvastatin (Lipitor) 80 mg tablet Take 1 tablet (80 mg) by mouth once daily. 90 tablet 0    blood-glucose sensor (FreeStyle Scarlet 3 Plus Sensor) device Apply 1 sensor every 15 days 6 each 2    fluticasone propion-salmeteroL (Advair Diskus) 250-50 mcg/dose diskus inhaler Inhale 1 puff 2 times a day. 180 each 3    FreeStyle Scarlet 3 [place] misc Use as instructed 1 each 0    losartan (Cozaar) 50 mg tablet Take 1 tablet (50 mg) by mouth once daily. 90 tablet 0    metoprolol succinate XL (Toprol-XL) 50 mg 24 hr tablet Take 1 tablet (50 mg) by mouth once daily. 90 tablet 0    multivit-min/ferrous fumarate (MULTI VITAMIN ORAL) Take 1 tablet by mouth once daily.      [DISCONTINUED] dapagliflozin propanediol (Farxiga) 10 mg Take 1 tablet (10 mg) by mouth once daily. 30 tablet 11    [DISCONTINUED] tirzepatide (Mounjaro) 12.5 mg/0.5 mL pen injector Inject 12.5 mg under the skin 1 (one) time per week. 2 mL 2     No current facility-administered medications on file prior to visit.        Assessment/Plan   Problem List Items Addressed This Visit       Type 2 diabetes mellitus with stage 4 chronic kidney disease and hypertension (Multi)    Relevant Medications    tirzepatide (Mounjaro) 12.5 mg/0.5 mL pen injector    dapagliflozin propanediol (Farxiga) 10 mg tablet    Other Relevant Orders    Referral to Clinical Pharmacy    Asthma with chronic obstructive pulmonary disease (COPD) (Multi)    Relevant Orders    Referral to Clinical Pharmacy       Plan: DM  Patient's A1c is stable, with last reading of 7.2% (goal <7%).  We had previously stopped glipizide due to overnight lows.  Her A1c is stable, FBGs down back to goal since last visit.  We will continue current management and follow up in 3 months.    CONTINUE Mounjaro 12.5 mg weekly and Farxiga 10 mg daily  Refill for Mounjaro sent to [facility] for mail to  patient  CONTINUE monitoring blood glucose with Freestyle Scarlet 3 Plus     Plan: Asthma/COPD  Patient reports continued control with Advair maintenance inhaler, requiring rescue albuterol infrequently  CONTINUE Advair and albuterol inhaler as needed  No refills needed at this time     Pharmacy Follow Up: 7/17/25 @ 11 am     Osiris ScanlonD  Clinical Pharmacy Specialist, Primary Care   [phone]    Continue all meds under the continuation of care with the referring provider and clinical pharmacy team.

## 2025-04-18 ENCOUNTER — PHARMACY VISIT (OUTPATIENT)
Dept: PHARMACY | Facility: CLINIC | Age: 75
End: 2025-04-18
Payer: COMMERCIAL

## 2025-05-10 PROCEDURE — RXMED WILLOW AMBULATORY MEDICATION CHARGE

## 2025-05-13 ENCOUNTER — PHARMACY VISIT (OUTPATIENT)
Dept: PHARMACY | Facility: CLINIC | Age: 75
End: 2025-05-13
Payer: COMMERCIAL

## 2025-05-19 PROCEDURE — RXMED WILLOW AMBULATORY MEDICATION CHARGE

## 2025-05-20 DIAGNOSIS — I10 ESSENTIAL HYPERTENSION: ICD-10-CM

## 2025-05-20 DIAGNOSIS — E78.00 HYPERCHOLESTEROLEMIA: ICD-10-CM

## 2025-05-20 RX ORDER — METOPROLOL SUCCINATE 50 MG/1
50 TABLET, EXTENDED RELEASE ORAL DAILY
Qty: 90 TABLET | Refills: 0 | Status: SHIPPED | OUTPATIENT
Start: 2025-05-20

## 2025-05-20 RX ORDER — ATORVASTATIN CALCIUM 80 MG/1
80 TABLET, FILM COATED ORAL DAILY
Qty: 90 TABLET | Refills: 0 | Status: SHIPPED | OUTPATIENT
Start: 2025-05-20

## 2025-05-20 RX ORDER — LOSARTAN POTASSIUM 50 MG/1
50 TABLET ORAL DAILY
Qty: 90 TABLET | Refills: 0 | Status: SHIPPED | OUTPATIENT
Start: 2025-05-20

## 2025-05-22 ENCOUNTER — PHARMACY VISIT (OUTPATIENT)
Dept: PHARMACY | Facility: CLINIC | Age: 75
End: 2025-05-22
Payer: COMMERCIAL

## 2025-06-06 ENCOUNTER — APPOINTMENT (OUTPATIENT)
Dept: PRIMARY CARE | Facility: CLINIC | Age: 75
End: 2025-06-06
Payer: MEDICARE

## 2025-06-06 VITALS
DIASTOLIC BLOOD PRESSURE: 67 MMHG | HEIGHT: 65 IN | BODY MASS INDEX: 38.32 KG/M2 | WEIGHT: 230 LBS | HEART RATE: 88 BPM | SYSTOLIC BLOOD PRESSURE: 124 MMHG

## 2025-06-06 DIAGNOSIS — E11.22 TYPE 2 DIABETES MELLITUS WITH STAGE 4 CHRONIC KIDNEY DISEASE AND HYPERTENSION (MULTI): ICD-10-CM

## 2025-06-06 DIAGNOSIS — J44.89 ASTHMA WITH CHRONIC OBSTRUCTIVE PULMONARY DISEASE (COPD) (MULTI): ICD-10-CM

## 2025-06-06 DIAGNOSIS — N18.4 TYPE 2 DIABETES MELLITUS WITH STAGE 4 CHRONIC KIDNEY DISEASE AND HYPERTENSION (MULTI): ICD-10-CM

## 2025-06-06 DIAGNOSIS — J45.40 MODERATE PERSISTENT ASTHMA WITHOUT COMPLICATION (HHS-HCC): ICD-10-CM

## 2025-06-06 DIAGNOSIS — I12.9 TYPE 2 DIABETES MELLITUS WITH STAGE 4 CHRONIC KIDNEY DISEASE AND HYPERTENSION (MULTI): ICD-10-CM

## 2025-06-06 DIAGNOSIS — N18.32 STAGE 3B CHRONIC KIDNEY DISEASE (MULTI): Primary | ICD-10-CM

## 2025-06-06 LAB — POC HEMOGLOBIN A1C: 6.4 % (ref 4.2–6.5)

## 2025-06-06 PROCEDURE — 3074F SYST BP LT 130 MM HG: CPT | Performed by: INTERNAL MEDICINE

## 2025-06-06 PROCEDURE — 1159F MED LIST DOCD IN RCRD: CPT | Performed by: INTERNAL MEDICINE

## 2025-06-06 PROCEDURE — 99214 OFFICE O/P EST MOD 30 MIN: CPT | Performed by: INTERNAL MEDICINE

## 2025-06-06 PROCEDURE — 3044F HG A1C LEVEL LT 7.0%: CPT | Performed by: INTERNAL MEDICINE

## 2025-06-06 PROCEDURE — 83036 HEMOGLOBIN GLYCOSYLATED A1C: CPT | Performed by: INTERNAL MEDICINE

## 2025-06-06 PROCEDURE — 1123F ACP DISCUSS/DSCN MKR DOCD: CPT | Performed by: INTERNAL MEDICINE

## 2025-06-06 PROCEDURE — 1036F TOBACCO NON-USER: CPT | Performed by: INTERNAL MEDICINE

## 2025-06-06 PROCEDURE — 4010F ACE/ARB THERAPY RXD/TAKEN: CPT | Performed by: INTERNAL MEDICINE

## 2025-06-06 PROCEDURE — 3078F DIAST BP <80 MM HG: CPT | Performed by: INTERNAL MEDICINE

## 2025-06-06 ASSESSMENT — ENCOUNTER SYMPTOMS
LOSS OF SENSATION IN FEET: 0
DEPRESSION: 0
OCCASIONAL FEELINGS OF UNSTEADINESS: 0

## 2025-06-06 NOTE — PROGRESS NOTES
Chief Complaint:   Chief Complaint   Patient presents with    Follow-up     Routine; no other concerns        Renee Candelario is a 75 y.o. year old female is here for follow-up.   HPI   Renee Candelario is here for follow up on chronic conditions.  Reports no new issues.  Compliant with medications.  Denies side effects.  Needs refills on medications.  Chart reviewed, pharmacy updated, prior notes, labs, imaging, EKGs reviewed.    Past Medical History   Medical History[1]   Past Surgical History:   Surgical History[2]  Family History:   Family History[3]  Social History:   Tobacco Use: Low Risk  (6/6/2025)    Patient History     Smoking Tobacco Use: Never     Smokeless Tobacco Use: Never     Passive Exposure: Past      Social History     Substance and Sexual Activity   Alcohol Use Not Currently        Allergies:   RX Allergies[4]     ROS   Review of Systems   All other systems reviewed and are negative.       Objective   Vitals:    06/06/25 1418   BP: 124/67   Pulse: 88      BMI Readings from Last 15 Encounters:   06/06/25 38.27 kg/m²   03/05/25 43.22 kg/m²   12/11/24 45.17 kg/m²   09/11/24 43.43 kg/m²   06/14/24 43.66 kg/m²   06/05/24 43.60 kg/m²   03/13/24 43.10 kg/m²   11/29/23 43.93 kg/m²   10/30/23 44.60 kg/m²   09/29/23 51.21 kg/m²   07/31/23 46.10 kg/m²   05/25/23 47.09 kg/m²   04/27/23 46.32 kg/m²   04/14/23 44.99 kg/m²      104 kg (230 lb) (6/6/2025  2:18 PM)      Physical Exam  Physical Exam  Neurological:      Mental Status: She is alert.   Psychiatric:         Mood and Affect: Mood normal.          Labs:  CBC:  Recent Labs     03/05/25  1448 04/14/23  1444 07/02/19  0450   WBC 5.6 8.5 8.4   HGB 12.0 12.4 12.3   HCT 37.2 41.9 38.8    292 287   MCV 93.2 101* 93     CMP:  Recent Labs     03/05/25  1448 09/19/24  1225 03/21/24  1344    141 140   K 3.9 4.2 4.1    109* 105   CO2 26 26 24   ANIONGAP 8 10 15   BUN 29* 25* 26*   CREATININE 1.65* 1.73* 1.61*   EGFR 32* 31* 34*   GLUCOSE 155* 158*  "126*     Recent Labs     03/05/25  1448 09/19/24  1225 03/21/24  1344 04/14/23  1444 07/01/19  0658   ALBUMIN 4.0 4.0 3.7   < > 4.3   ALKPHOS 101 107 99   < > 119   ALT 10 10 12   < > 17   AST 13 12 12   < > 18   BILITOT 0.3 0.4 0.4   < > 0.5   LIPASE  --   --   --   --  36    < > = values in this interval not displayed.     Calcium/Phos:   Lab Results   Component Value Date    CALCIUM 9.1 03/05/2025    PHOS 5.0 (H) 03/21/2024      COAG:   Recent Labs     07/01/19  1158   DDIMERVTE 926*     CRP: No results found for: \"CRP\"   [unfilled]   ENDO:  Recent Labs     06/06/25  1502 03/05/25  1448 09/19/24  1225 06/14/24  1354 07/31/23  1322 04/14/23  1444 06/05/20  1250 07/02/19  0450   TSH  --  0.79  --   --   --  1.24  --  0.89   HGBA1C 6.4 7.2* 7.1* 6.8*   < > 10.7*   < >  --     < > = values in this interval not displayed.      CARDIAC:   Recent Labs     07/01/19  1150   *     Recent Labs     03/05/25  1448 09/19/24  1225 10/30/23  1400 04/14/23  1444   CHOL 150 151 188 188   LDLF  --   --   --  83   LDLCALC 79 79 110*  --    HDL 48* 44.5 43.7 49.2   TRIG 129 137 171* 279*     No data recorded    Current Medications:  Current Medications[5]    Assessment and Plan  Renee was seen today for follow-up.  Diagnoses and all orders for this visit:  Stage 3b chronic kidney disease (Multi) (Primary)  -     Referral to Nephrology; Future  Asthma with chronic obstructive pulmonary disease (COPD) (Multi)  Moderate persistent asthma without complication ([facility])  Type 2 diabetes mellitus with stage 4 chronic kidney disease and hypertension (Multi)  -     POCT glycosylated hemoglobin (Hb A1C) manually resulted     A1C much better  Continue with current treatment  Significant weight loss on Mounjaro  Has CKD  Stable levels  Was unable to schedule nephrology visit  Scheduled it now  Due for vaccinations at the pharmacy    By optimizing your health through good nutrition, daily exercise, stress management, low/moderate " alcohol and avoidance of tobacco, sugar and processed foods, you can help to decrease your risk of cardiovascular disease and stroke and achieve a healthy weight. A diet rich in whole, plant-based foods such as vegetables, beans, seeds, nuts, whole grains, healthy fats and fruit - leads to lower body mass index, blood pressure, HbA1C, and cholesterol levels.         Immunizations:  Immunization History   Administered Date(s) Administered    Flu vaccine, quadrivalent, high-dose, preservative free, age 65y+ (FLUZONE) 10/24/2005    Influenza, seasonal, injectable 10/24/2005    Moderna COVID-19 vaccine, bivalent, blue cap/gray label *Check age/dose* 10/09/2022    Moderna SARS-CoV-2 Vaccination 03/26/2021, 04/23/2021, 12/08/2021, 04/16/2022    PPD Test 11/23/2012    Pneumococcal conjugate vaccine, 13-valent (PREVNAR 13) 09/03/2015    Pneumococcal polysaccharide vaccine, 23-valent, age 2 years and older (PNEUMOVAX 23) 02/21/2005, 09/11/2018    Tdap vaccine, age 7 year and older (BOOSTRIX, ADACEL) 11/23/2012                [1] No past medical history on file.  [2] No past surgical history on file.  [3] No family history on file.  [4]   Allergies  Allergen Reactions    Lisinopril Cough     cough    Other reaction(s): Cough   cough   cough   cough   cough    cough   cough   [5]   Current Outpatient Medications   Medication Sig Dispense Refill    albuterol 90 mcg/actuation inhaler Inhale 2 puffs every 6 hours if needed for wheezing or shortness of breath. 18 g 2    aspirin 81 mg EC tablet Take 1 tablet (81 mg) by mouth once daily. 90 tablet 1    atorvastatin (Lipitor) 80 mg tablet Take 1 tablet (80 mg) by mouth once daily. 90 tablet 0    blood-glucose sensor (FreeStyle Scarlet 3 Plus Sensor) device Apply 1 sensor every 15 days 6 each 2    dapagliflozin propanediol (Farxiga) 10 mg tablet Take 1 tablet (10 mg) by mouth once daily. 30 tablet 2    fluticasone propion-salmeteroL (Advair Diskus) 250-50 mcg/dose diskus inhaler Inhale  1 puff 2 times a day. 180 each 3    FreeStyle Scarlet 3 [place] misc Use as instructed 1 each 0    losartan (Cozaar) 50 mg tablet Take 1 tablet (50 mg) by mouth once daily. 90 tablet 0    metoprolol succinate XL (Toprol-XL) 50 mg 24 hr tablet Take 1 tablet (50 mg) by mouth once daily. 90 tablet 0    multivit-min/ferrous fumarate (MULTI VITAMIN ORAL) Take 1 tablet by mouth once daily.      tirzepatide (Mounjaro) 12.5 mg/0.5 mL pen injector Inject 12.5 mg under the skin 1 (one) time per week. 2 mL 2     No current facility-administered medications for this visit.

## 2025-06-14 PROCEDURE — RXMED WILLOW AMBULATORY MEDICATION CHARGE

## 2025-06-17 ENCOUNTER — PHARMACY VISIT (OUTPATIENT)
Dept: PHARMACY | Facility: CLINIC | Age: 75
End: 2025-06-17
Payer: COMMERCIAL

## 2025-06-20 DIAGNOSIS — N18.4 TYPE 2 DIABETES MELLITUS WITH STAGE 4 CHRONIC KIDNEY DISEASE AND HYPERTENSION (MULTI): ICD-10-CM

## 2025-06-20 DIAGNOSIS — E11.22 TYPE 2 DIABETES MELLITUS WITH STAGE 4 CHRONIC KIDNEY DISEASE AND HYPERTENSION (MULTI): ICD-10-CM

## 2025-06-20 DIAGNOSIS — I12.9 TYPE 2 DIABETES MELLITUS WITH STAGE 4 CHRONIC KIDNEY DISEASE AND HYPERTENSION (MULTI): ICD-10-CM

## 2025-06-21 RX ORDER — DAPAGLIFLOZIN 10 MG/1
10 TABLET, FILM COATED ORAL DAILY
Qty: 30 TABLET | Refills: 0 | Status: SHIPPED | OUTPATIENT
Start: 2025-06-21

## 2025-07-17 ENCOUNTER — APPOINTMENT (OUTPATIENT)
Dept: PHARMACY | Facility: HOSPITAL | Age: 75
End: 2025-07-17
Payer: MEDICARE

## 2025-08-11 ENCOUNTER — APPOINTMENT (OUTPATIENT)
Dept: PHARMACY | Facility: HOSPITAL | Age: 75
End: 2025-08-11
Payer: MEDICARE

## 2025-08-11 DIAGNOSIS — E11.22 TYPE 2 DIABETES MELLITUS WITH STAGE 4 CHRONIC KIDNEY DISEASE AND HYPERTENSION (MULTI): ICD-10-CM

## 2025-08-11 DIAGNOSIS — N18.4 TYPE 2 DIABETES MELLITUS WITH STAGE 4 CHRONIC KIDNEY DISEASE AND HYPERTENSION (MULTI): ICD-10-CM

## 2025-08-11 DIAGNOSIS — I12.9 TYPE 2 DIABETES MELLITUS WITH STAGE 4 CHRONIC KIDNEY DISEASE AND HYPERTENSION (MULTI): ICD-10-CM

## 2025-08-11 DIAGNOSIS — J45.40 MODERATE PERSISTENT ASTHMA WITHOUT COMPLICATION (HHS-HCC): ICD-10-CM

## 2025-08-11 PROCEDURE — RXMED WILLOW AMBULATORY MEDICATION CHARGE

## 2025-08-11 RX ORDER — TIRZEPATIDE 12.5 MG/.5ML
12.5 INJECTION, SOLUTION SUBCUTANEOUS WEEKLY
Qty: 2 ML | Refills: 3 | Status: SHIPPED | OUTPATIENT
Start: 2025-08-11

## 2025-08-11 RX ORDER — ALBUTEROL SULFATE 90 UG/1
2 INHALANT RESPIRATORY (INHALATION) EVERY 6 HOURS PRN
Qty: 18 G | Refills: 2 | Status: SHIPPED | OUTPATIENT
Start: 2025-08-11

## 2025-08-11 RX ORDER — DAPAGLIFLOZIN 10 MG/1
10 TABLET, FILM COATED ORAL DAILY
Qty: 30 TABLET | Refills: 3 | Status: SHIPPED | OUTPATIENT
Start: 2025-08-11

## 2025-08-12 ENCOUNTER — PHARMACY VISIT (OUTPATIENT)
Dept: PHARMACY | Facility: CLINIC | Age: 75
End: 2025-08-12
Payer: COMMERCIAL

## 2025-08-13 ENCOUNTER — PHARMACY VISIT (OUTPATIENT)
Dept: PHARMACY | Facility: CLINIC | Age: 75
End: 2025-08-13

## 2025-08-18 DIAGNOSIS — I10 ESSENTIAL HYPERTENSION: ICD-10-CM

## 2025-08-18 DIAGNOSIS — E78.00 HYPERCHOLESTEROLEMIA: ICD-10-CM

## 2025-08-18 RX ORDER — METOPROLOL SUCCINATE 50 MG/1
50 TABLET, EXTENDED RELEASE ORAL DAILY
Qty: 90 TABLET | Refills: 0 | Status: SHIPPED | OUTPATIENT
Start: 2025-08-18

## 2025-08-18 RX ORDER — ATORVASTATIN CALCIUM 80 MG/1
80 TABLET, FILM COATED ORAL DAILY
Qty: 90 TABLET | Refills: 0 | Status: SHIPPED | OUTPATIENT
Start: 2025-08-18

## 2025-08-26 DIAGNOSIS — I10 ESSENTIAL HYPERTENSION: ICD-10-CM

## 2025-08-26 RX ORDER — LOSARTAN POTASSIUM 50 MG/1
50 TABLET ORAL DAILY
Qty: 90 TABLET | Refills: 0 | Status: SHIPPED | OUTPATIENT
Start: 2025-08-26

## 2025-09-05 ENCOUNTER — PHARMACY VISIT (OUTPATIENT)
Dept: PHARMACY | Facility: CLINIC | Age: 75
End: 2025-09-05
Payer: COMMERCIAL

## 2025-09-05 PROCEDURE — RXMED WILLOW AMBULATORY MEDICATION CHARGE

## 2025-09-30 ENCOUNTER — APPOINTMENT (OUTPATIENT)
Dept: OPHTHALMOLOGY | Facility: CLINIC | Age: 75
End: 2025-09-30
Payer: MEDICARE

## 2025-11-10 ENCOUNTER — APPOINTMENT (OUTPATIENT)
Dept: PHARMACY | Facility: HOSPITAL | Age: 75
End: 2025-11-10
Payer: MEDICARE

## 2025-12-17 ENCOUNTER — APPOINTMENT (OUTPATIENT)
Dept: NEPHROLOGY | Facility: CLINIC | Age: 75
End: 2025-12-17
Payer: MEDICARE